# Patient Record
Sex: FEMALE | Race: WHITE | NOT HISPANIC OR LATINO | Employment: UNEMPLOYED | URBAN - METROPOLITAN AREA
[De-identification: names, ages, dates, MRNs, and addresses within clinical notes are randomized per-mention and may not be internally consistent; named-entity substitution may affect disease eponyms.]

---

## 2018-08-20 ENCOUNTER — HISTORICAL (OUTPATIENT)
Dept: LAB | Facility: HOSPITAL | Age: 28
End: 2018-08-20

## 2018-08-20 LAB
ALBUMIN SERPL-MCNC: 4.1 GM/DL (ref 3.4–5)
ALBUMIN/GLOB SERPL: 1.2 RATIO (ref 1.1–2)
ALP SERPL-CCNC: 52 UNIT/L (ref 46–116)
ALT SERPL-CCNC: 20 UNIT/L (ref 12–78)
AMPHET UR QL SCN: ABNORMAL
AST SERPL-CCNC: 14 UNIT/L (ref 15–37)
BARBITURATE SCN PRESENT UR: ABNORMAL
BENZODIAZ UR QL SCN: ABNORMAL
BILIRUB SERPL-MCNC: 1 MG/DL (ref 0.2–1)
BILIRUBIN DIRECT+TOT PNL SERPL-MCNC: 0.22 MG/DL (ref 0–0.2)
BILIRUBIN DIRECT+TOT PNL SERPL-MCNC: 0.78 MG/DL (ref 0–0.8)
BUN SERPL-MCNC: 10.8 MG/DL (ref 7–18)
CALCIUM SERPL-MCNC: 10.1 MG/DL (ref 8.5–10.1)
CANNABINOIDS UR QL SCN: ABNORMAL
CHLORIDE SERPL-SCNC: 102 MMOL/L (ref 98–107)
CO2 SERPL-SCNC: 27.5 MMOL/L (ref 21–32)
COCAINE UR QL SCN: ABNORMAL
CREAT SERPL-MCNC: 0.77 MG/DL (ref 0.6–1.3)
ERYTHROCYTE [DISTWIDTH] IN BLOOD BY AUTOMATED COUNT: 15.9 % (ref 11.5–17)
GLOBULIN SER-MCNC: 3.3 GM/DL (ref 2.4–3.5)
GLUCOSE SERPL-MCNC: 102 MG/DL (ref 74–106)
HCT VFR BLD AUTO: 32.9 % (ref 37–47)
HGB BLD-MCNC: 10.5 GM/DL (ref 12–16)
MCH RBC QN AUTO: 20 PG (ref 27–31)
MCHC RBC AUTO-ENTMCNC: 31.9 GM/DL (ref 33–36)
MCV RBC AUTO: 62.8 FL (ref 80–94)
OPIATES UR QL SCN: ABNORMAL
PCP UR QL: ABNORMAL
PH UR STRIP.AUTO: 6.5 [PH] (ref 5–7.5)
PLATELET # BLD AUTO: 273 X10(3)/MCL (ref 130–400)
PMV BLD AUTO: 9.3 FL (ref 9.4–12.4)
POTASSIUM SERPL-SCNC: 3.8 MMOL/L (ref 3.5–5.1)
PROT SERPL-MCNC: 7.4 GM/DL (ref 6.4–8.2)
RBC # BLD AUTO: 5.24 X10(6)/MCL (ref 4.2–5.4)
SODIUM SERPL-SCNC: 140 MMOL/L (ref 136–145)
TSH SERPL-ACNC: 2.01 MIU/ML (ref 0.36–3.74)
WBC # SPEC AUTO: 6 X10(3)/MCL (ref 4.5–11.5)

## 2019-04-29 ENCOUNTER — HISTORICAL (OUTPATIENT)
Dept: ADMINISTRATIVE | Facility: HOSPITAL | Age: 29
End: 2019-04-29

## 2020-06-15 ENCOUNTER — HISTORICAL (OUTPATIENT)
Dept: ADMINISTRATIVE | Facility: HOSPITAL | Age: 30
End: 2020-06-15

## 2020-06-15 LAB
APPEARANCE, UA: NORMAL
B-HCG UR QL: NEGATIVE
BACTERIA SPEC CULT: NEGATIVE /HPF
BILIRUB UR QL STRIP: NEGATIVE MG/DL
BUDDING YEAST: NORMAL /HPF
CASTS, URINE MICROSCOPIC: NEGATIVE /LPF
COLOR UR: YELLOW
EPITHELIAL, URINE MICROSCOPIC: NEGATIVE /HPF
GLUCOSE (UA): NEGATIVE MG/DL
HGB UR QL STRIP: NEGATIVE ERY/UL
INTERNAL NEG CONTROL: NEGATIVE
INTERNAL POS CONTROL: POSITIVE
KETONES UR QL STRIP: NEGATIVE MG/DL
LEUKOCYTE ESTERASE UR QL STRIP: NEGATIVE LEU/UL
NITRITE UR QL STRIP: NEGATIVE MG/DL
PH UR STRIP: 7 [PH] (ref 5–7.5)
PROT UR QL STRIP: NEGATIVE MG/DL
RBC #/AREA URNS HPF: NEGATIVE /HPF
SP GR UR STRIP: 1.02 (ref 1–1.03)
SPERM, URINE MICROSCOPIC: NORMAL /HPF
TYPE OF SPECIMEN  (UA): NORMAL
UNCLASSIFIED CRYSTALS, UA: NORMAL /HPF
UROBILINOGEN UR STRIP-ACNC: 1 EU/L
WBC #/AREA URNS HPF: 6 /HPF

## 2020-07-31 ENCOUNTER — HOSPITAL ENCOUNTER (EMERGENCY)
Facility: HOSPITAL | Age: 30
Discharge: HOME | End: 2020-07-31
Attending: FAMILY MEDICINE
Payer: MEDICAID

## 2020-07-31 VITALS
RESPIRATION RATE: 18 BRPM | OXYGEN SATURATION: 99 % | HEIGHT: 63 IN | TEMPERATURE: 99 F | HEART RATE: 91 BPM | SYSTOLIC BLOOD PRESSURE: 102 MMHG | WEIGHT: 139 LBS | BODY MASS INDEX: 24.63 KG/M2 | DIASTOLIC BLOOD PRESSURE: 62 MMHG

## 2020-07-31 DIAGNOSIS — R21 RASH: Primary | ICD-10-CM

## 2020-07-31 PROCEDURE — 99284 EMERGENCY DEPT VISIT MOD MDM: CPT | Mod: 25

## 2020-07-31 PROCEDURE — 63600175 PHARM REV CODE 636 W HCPCS: Performed by: NURSE PRACTITIONER

## 2020-07-31 PROCEDURE — 96372 THER/PROPH/DIAG INJ SC/IM: CPT

## 2020-07-31 RX ORDER — DEXTROAMPHETAMINE SACCHARATE, AMPHETAMINE ASPARTATE, DEXTROAMPHETAMINE SULFATE AND AMPHETAMINE SULFATE 7.5; 7.5; 7.5; 7.5 MG/1; MG/1; MG/1; MG/1
TABLET ORAL
COMMUNITY
End: 2023-01-11 | Stop reason: DRUGHIGH

## 2020-07-31 RX ORDER — METHYLPREDNISOLONE ACETATE 80 MG/ML
80 INJECTION, SUSPENSION INTRA-ARTICULAR; INTRALESIONAL; INTRAMUSCULAR; SOFT TISSUE
Status: COMPLETED | OUTPATIENT
Start: 2020-07-31 | End: 2020-07-31

## 2020-07-31 RX ORDER — TRIAMCINOLONE ACETONIDE 1 MG/G
CREAM TOPICAL 2 TIMES DAILY
Qty: 30 G | Refills: 0 | Status: SHIPPED | OUTPATIENT
Start: 2020-07-31 | End: 2023-07-12

## 2020-07-31 RX ADMIN — METHYLPREDNISOLONE ACETATE 80 MG: 80 INJECTION, SUSPENSION INTRA-ARTICULAR; INTRALESIONAL; INTRAMUSCULAR; SOFT TISSUE at 06:07

## 2020-07-31 NOTE — ED NOTES
Multiple small open areas to bilateral arms, left side of neck, left leg. No drainage noted.      Jemima Marinelli RN  07/31/20 1841       Jemima Marinelli RN  07/31/20 9123

## 2020-07-31 NOTE — ED PROVIDER NOTES
Encounter Date: 2020       History     Chief Complaint   Patient presents with    Skin Problem     Been having a cat for 2 weeks, and now I have these bites all over me, they are itchy, location - face, neck, arms and legs       Rash   This is a new problem. The current episode started several weeks ago. The problem has been unchanged. The problem is associated with animal contact (CAT). Affected Location: ARMS AND LEGS. The pain is at a severity of 0/10. Associated symptoms include itching. Treatments tried: TEA TREEE OIL  The treatment provided no relief.     Review of patient's allergies indicates:  No Known Allergies  History reviewed. No pertinent past medical history.  Past Surgical History:   Procedure Laterality Date     SECTION      DILATION AND CURETTAGE OF UTERUS      LAPAROSCOPY       History reviewed. No pertinent family history.  Social History     Tobacco Use    Smoking status: Never Smoker    Smokeless tobacco: Never Used   Substance Use Topics    Alcohol use: Not Currently    Drug use: Not on file     Review of Systems   Constitutional: Negative for fever.   HENT: Negative for sore throat.    Respiratory: Negative for shortness of breath.    Cardiovascular: Negative for chest pain.   Gastrointestinal: Negative for nausea.   Genitourinary: Negative for dysuria.   Musculoskeletal: Negative for back pain.   Skin: Positive for itching.   Neurological: Negative for weakness.   Hematological: Does not bruise/bleed easily.       Physical Exam     Initial Vitals [20 1719]   BP Pulse Resp Temp SpO2   102/62 91 18 98.7 °F (37.1 °C) 99 %      MAP       --         Physical Exam    Nursing note and vitals reviewed.  Constitutional: She appears well-developed and well-nourished.   HENT:   Mouth/Throat: Oropharynx is clear and moist.   Eyes: Conjunctivae are normal.   Neck: Phonation normal. No stridor present.   Cardiovascular: Intact distal pulses and normal pulses.   Pulmonary/Chest:  Effort normal. No accessory muscle usage or stridor. No respiratory distress.   Neurological: She is alert and oriented to person, place, and time.   Skin: Skin is warm and dry. Rash noted.              ED Course   Procedures  Labs Reviewed - No data to display       Imaging Results    None                                          Clinical Impression:       ICD-10-CM ICD-9-CM   1. Rash  R21 782.1             ED Disposition Condition    Discharge Stable        ED Prescriptions     Medication Sig Dispense Start Date End Date Auth. Provider    triamcinolone acetonide 0.1% (KENALOG) 0.1 % cream Apply topically 2 (two) times daily. 30 g 7/31/2020  Tyshawn Ruiz NP        Follow-up Information     Follow up With Specialties Details Why Contact Info    [facility] Psychology, Internal Medicine, Gynecology, Dental General Practice   [address] [place] [id number]  [phone]                                       Tyshawn Ruiz NP  07/31/20 1582

## 2020-08-02 ENCOUNTER — HOSPITAL ENCOUNTER (EMERGENCY)
Facility: HOSPITAL | Age: 30
Discharge: HOME | End: 2020-08-02
Attending: SURGERY
Payer: MEDICAID

## 2020-08-02 VITALS
HEART RATE: 72 BPM | WEIGHT: 143.75 LBS | OXYGEN SATURATION: 100 % | DIASTOLIC BLOOD PRESSURE: 81 MMHG | SYSTOLIC BLOOD PRESSURE: 137 MMHG | BODY MASS INDEX: 25.46 KG/M2 | RESPIRATION RATE: 16 BRPM | TEMPERATURE: 97 F

## 2020-08-02 DIAGNOSIS — R21 RASH AND NONSPECIFIC SKIN ERUPTION: Primary | ICD-10-CM

## 2020-08-02 PROCEDURE — 96372 THER/PROPH/DIAG INJ SC/IM: CPT

## 2020-08-02 PROCEDURE — 25000003 PHARM REV CODE 250: Performed by: SURGERY

## 2020-08-02 PROCEDURE — 99284 EMERGENCY DEPT VISIT MOD MDM: CPT | Mod: 25

## 2020-08-02 PROCEDURE — 63600175 PHARM REV CODE 636 W HCPCS: Performed by: SURGERY

## 2020-08-02 RX ORDER — PERMETHRIN 50 MG/G
CREAM TOPICAL
Qty: 60 G | Refills: 1 | Status: SHIPPED | OUTPATIENT
Start: 2020-08-02 | End: 2023-07-12

## 2020-08-02 RX ORDER — DIPHENHYDRAMINE HCL 50 MG
50 CAPSULE ORAL EVERY 6 HOURS PRN
Qty: 20 CAPSULE | Refills: 0 | Status: SHIPPED | OUTPATIENT
Start: 2020-08-02 | End: 2020-08-02 | Stop reason: SDUPTHER

## 2020-08-02 RX ORDER — METHYLPREDNISOLONE 4 MG/1
TABLET ORAL
Qty: 1 PACKAGE | Refills: 0 | Status: SHIPPED | OUTPATIENT
Start: 2020-08-02 | End: 2020-08-02 | Stop reason: SDUPTHER

## 2020-08-02 RX ORDER — CLINDAMYCIN HYDROCHLORIDE 300 MG/1
300 CAPSULE ORAL 4 TIMES DAILY
Qty: 28 CAPSULE | Refills: 0 | Status: SHIPPED | OUTPATIENT
Start: 2020-08-02 | End: 2020-08-02 | Stop reason: SDUPTHER

## 2020-08-02 RX ORDER — CLINDAMYCIN HYDROCHLORIDE 300 MG/1
300 CAPSULE ORAL 4 TIMES DAILY
Qty: 28 CAPSULE | Refills: 0 | Status: SHIPPED | OUTPATIENT
Start: 2020-08-02 | End: 2020-08-09

## 2020-08-02 RX ORDER — MUPIROCIN 20 MG/G
OINTMENT TOPICAL 3 TIMES DAILY
Qty: 15 G | Refills: 0 | Status: SHIPPED | OUTPATIENT
Start: 2020-08-02 | End: 2020-08-02 | Stop reason: SDUPTHER

## 2020-08-02 RX ORDER — METHYLPREDNISOLONE SOD SUCC 125 MG
125 VIAL (EA) INJECTION
Status: COMPLETED | OUTPATIENT
Start: 2020-08-02 | End: 2020-08-02

## 2020-08-02 RX ORDER — DIPHENHYDRAMINE HYDROCHLORIDE 50 MG/ML
50 INJECTION INTRAMUSCULAR; INTRAVENOUS
Status: DISCONTINUED | OUTPATIENT
Start: 2020-08-02 | End: 2020-08-02 | Stop reason: HOSPADM

## 2020-08-02 RX ORDER — CLINDAMYCIN PHOSPHATE 150 MG/ML
600 INJECTION, SOLUTION INTRAVENOUS
Status: COMPLETED | OUTPATIENT
Start: 2020-08-02 | End: 2020-08-02

## 2020-08-02 RX ORDER — DIPHENHYDRAMINE HCL 50 MG
50 CAPSULE ORAL EVERY 6 HOURS PRN
Qty: 20 CAPSULE | Refills: 0 | Status: SHIPPED | OUTPATIENT
Start: 2020-08-02 | End: 2023-07-12

## 2020-08-02 RX ORDER — MUPIROCIN 20 MG/G
OINTMENT TOPICAL 3 TIMES DAILY
Qty: 15 G | Refills: 0 | Status: SHIPPED | OUTPATIENT
Start: 2020-08-02 | End: 2020-08-12

## 2020-08-02 RX ORDER — METHYLPREDNISOLONE 4 MG/1
TABLET ORAL
Qty: 1 PACKAGE | Refills: 0 | Status: SHIPPED | OUTPATIENT
Start: 2020-08-02 | End: 2023-07-12

## 2020-08-02 RX ADMIN — METHYLPREDNISOLONE SODIUM SUCCINATE 125 MG: 125 INJECTION, POWDER, FOR SOLUTION INTRAMUSCULAR; INTRAVENOUS at 03:08

## 2020-08-02 RX ADMIN — CLINDAMYCIN PHOSPHATE 600 MG: 150 INJECTION, SOLUTION INTRAMUSCULAR; INTRAVENOUS at 03:08

## 2020-08-02 NOTE — ED PROVIDER NOTES
[facility]                                                 Chief Complaint  29 y.o. female with Insect Bite (bug bites noted to arms and legs)      History of Present Illness  Petra Springer presents to the emergency room with continued rash  Patient was seen in another emergency room with a diagnosis of ringworm  Patient states she wants to be treated for mites, wants permethrin Rx now  Patient on exam has indurated pustules on the trunk extremities this p.m.  Patient states she recently adopted a stray cat with rash on set afterwards    The history is provided by the patient   device was not used during this ER visit  History reviewed. No pertinent past medical history.   Surgeries:   section, laparoscopy and D&C  No Known Allergies     I have reviewed all of this patient's past medical, surgical, family, and social   histories as well as active allergies and medications documented in the  electronic medical record    Review of Systems and Physical Exam      Review of Systems  -- Constitution - no fever, denies fatigue, no weakness, no chills  -- Eyes - no tearing or redness, no visual disturbance  -- Ear, Nose - no tinnitus or earache, no nasal congestion or discharge  -- Mouth,Throat - no sore throat, no toothache, normal voice, normal swallowing  -- Respiratory - denies cough and congestion, no shortness of breath, no WEEMS  -- Cardiovascular - denies chest pain, no palpitations, denies claudication  -- Gastrointestinal - denies abdominal pain, nausea, vomiting, or diarrhea  -- Genitourinary - no dysuria, denies flank pain, no hematuria, no STD risk  -- Musculoskeletal - denies back pain, negative for trauma or injury  -- Neurological - no headache, denies weakness or seizure; no LOC  -- Skin - nonspecific rash on trunk extremities    Vital Signs  Her oral temperature is 97 °F (36.1 °C).   Her blood pressure is 137/81 and her pulse is 72.   Her respiration is  16 and oxygen saturation is 100%.     Physical Exam  -- Nursing note and vitals reviewed  -- Constitutional: Appears well-developed and well-nourished  -- Head: Atraumatic. Normocephalic. No obvious abnormality  -- Eyes: Pupils are equal and reactive to light. Normal conjunctiva and lids  -- Nose: Nose normal in appearance, nares grossly normal. No discharge  -- Throat: Mucous membranes moist, pharynx normal, normal tonsils. No lesions   -- Ears: External ears and TM normal bilaterally. Normal hearing and no drainage  -- Neck: Normal range of motion. Neck supple. No masses, trachea midline  -- Cardiac: Normal rate, regular rhythm and normal heart sounds  -- Pulmonary: Normal respiratory effort, breath sounds clear to auscultation  -- Abdominal: Soft, no tenderness. Normal bowel sounds. Normal liver edge  -- Musculoskeletal: Normal range of motion, no effusions. Joints stable   -- Neurological: No focal deficits. Showed good interaction with staff  -- Vascular: Posterior tibial, dorsalis pedis and radial pulses 2+ bilaterally    -- Lymphatics: No cervical or peripheral lymphadenopathy. No edema noted  -- Skin:  Nonspecific rash on trunk extremities    Emergency Room Course      Medications Given  clindamycin injection 600 mg (has no administration in time range)   diphenhydrAMINE injection 50 mg (has no administration in time range)   methylPREDNISolone sodium succinate injection 125 mg     ED Physician Management  -- Diagnosis management comments: 29 y.o. female with infected folliculitis  -- patient is asking for prescription of permethrin cream on ER discharge  -- patient thinks that she has bedbugs versus might, will give Rx today  -- patient has infected folliculitis, needs antibiotics with ointment Rx  -- patient extensively counseled follow up with Dermatology regarding this  -- patient needs thorough evaluation outpatient until resolution  -- return to the ER with any concerning symptoms after  discharge    Diagnosis  [R21] Rash and nonspecific skin eruption (Primary)    Disposition and Plan  -- Disposition: home  -- Condition: stable  -- Follow-up: Patient to follow up with MD in 1-2 days.  -- I advised the patient that we have found no life threatening condition today  -- At this time, I believe the patient is clinically stable for discharge.   -- The patient acknowledges that close follow up with a MD is required   -- Patient agrees to comply with all instruction and direction given in the ER    This note is dictated on M*Modal word recognition program.  There are word recognition mistakes that are occasionally missed on review.         Prosper Gonzales MD  08/02/20 7765

## 2020-08-02 NOTE — ED TRIAGE NOTES
29 y.o. female presents to ER qTrack 01/qTrk 01   Chief Complaint   Patient presents with    Insect Bite     bug bites noted to arms and legs   . No acute distress noted.

## 2022-07-20 ENCOUNTER — HOSPITAL ENCOUNTER (EMERGENCY)
Facility: HOSPITAL | Age: 32
Discharge: HOME | End: 2022-07-20
Attending: EMERGENCY MEDICINE
Payer: MEDICAID

## 2022-07-20 VITALS
SYSTOLIC BLOOD PRESSURE: 121 MMHG | WEIGHT: 137 LBS | DIASTOLIC BLOOD PRESSURE: 70 MMHG | RESPIRATION RATE: 18 BRPM | OXYGEN SATURATION: 99 % | HEART RATE: 70 BPM | TEMPERATURE: 99 F | BODY MASS INDEX: 24.27 KG/M2 | HEIGHT: 63 IN

## 2022-07-20 DIAGNOSIS — N20.0 KIDNEY STONE: Primary | ICD-10-CM

## 2022-07-20 DIAGNOSIS — D50.9 MICROCYTIC ANEMIA: ICD-10-CM

## 2022-07-20 LAB
ALBUMIN SERPL-MCNC: 4.3 GM/DL (ref 3.5–5)
ALBUMIN/GLOB SERPL: 1.4 RATIO (ref 1.1–2)
ALP SERPL-CCNC: 56 UNIT/L (ref 40–150)
ALT SERPL-CCNC: 12 UNIT/L (ref 0–55)
APPEARANCE UR: CLEAR
AST SERPL-CCNC: 14 UNIT/L (ref 5–34)
B-HCG SERPL QL: NEGATIVE
BACTERIA #/AREA URNS AUTO: ABNORMAL /HPF
BASOPHILS # BLD AUTO: 0.09 X10(3)/MCL (ref 0–0.2)
BASOPHILS NFR BLD AUTO: 0.7 %
BILIRUB UR QL STRIP.AUTO: NEGATIVE MG/DL
BILIRUBIN DIRECT+TOT PNL SERPL-MCNC: 1.1 MG/DL
BUN SERPL-MCNC: 13 MG/DL (ref 7–18.7)
CALCIUM SERPL-MCNC: 11.6 MG/DL (ref 8.4–10.2)
CHLORIDE SERPL-SCNC: 107 MMOL/L (ref 98–107)
CO2 SERPL-SCNC: 23 MMOL/L (ref 22–29)
COLOR UR AUTO: YELLOW
CREAT SERPL-MCNC: 0.75 MG/DL (ref 0.55–1.02)
EOSINOPHIL # BLD AUTO: 0.05 X10(3)/MCL (ref 0–0.9)
EOSINOPHIL NFR BLD AUTO: 0.4 %
ERYTHROCYTE [DISTWIDTH] IN BLOOD BY AUTOMATED COUNT: 16.5 % (ref 11.5–17)
GLOBULIN SER-MCNC: 3.1 GM/DL (ref 2.4–3.5)
GLUCOSE SERPL-MCNC: 98 MG/DL (ref 74–100)
GLUCOSE UR QL STRIP.AUTO: NEGATIVE MG/DL
HCT VFR BLD AUTO: 32.5 % (ref 37–47)
HGB BLD-MCNC: 10.1 GM/DL (ref 12–16)
HYPOCHROMIA BLD QL SMEAR: SLIGHT
IMM GRANULOCYTES # BLD AUTO: 0.08 X10(3)/MCL (ref 0–0.04)
IMM GRANULOCYTES NFR BLD AUTO: 0.6 %
KETONES UR QL STRIP.AUTO: NEGATIVE MG/DL
LEUKOCYTE ESTERASE UR QL STRIP.AUTO: NEGATIVE UNIT/L
LYMPHOCYTES # BLD AUTO: 1.57 X10(3)/MCL (ref 0.6–4.6)
LYMPHOCYTES NFR BLD AUTO: 11.8 %
MACROCYTES BLD QL SMEAR: SLIGHT
MCH RBC QN AUTO: 20 PG (ref 27–31)
MCHC RBC AUTO-ENTMCNC: 31.1 MG/DL (ref 33–36)
MCV RBC AUTO: 64.4 FL (ref 80–94)
MONOCYTES # BLD AUTO: 0.53 X10(3)/MCL (ref 0.1–1.3)
MONOCYTES NFR BLD AUTO: 4 %
MUCOUS THREADS URNS QL MICRO: ABNORMAL /LPF
NEUTROPHILS # BLD AUTO: 11 X10(3)/MCL (ref 2.1–9.2)
NEUTROPHILS NFR BLD AUTO: 82.5 %
NITRITE UR QL STRIP.AUTO: NEGATIVE
OVALOCYTES (OLG): ABNORMAL
PH UR STRIP.AUTO: 5 [PH]
PLATELET # BLD AUTO: 313 X10(3)/MCL (ref 130–400)
PLATELET # BLD EST: ADEQUATE 10*3/UL
PMV BLD AUTO: 10.6 FL (ref 7.4–10.4)
POIKILOCYTOSIS BLD QL SMEAR: ABNORMAL
POTASSIUM SERPL-SCNC: 4 MMOL/L (ref 3.5–5.1)
PROT SERPL-MCNC: 7.4 GM/DL (ref 6.4–8.3)
PROT UR QL STRIP.AUTO: ABNORMAL MG/DL
RBC # BLD AUTO: 5.05 X10(6)/MCL (ref 4.2–5.4)
RBC #/AREA URNS AUTO: ABNORMAL /HPF
RBC MORPH BLD: ABNORMAL
RBC UR QL AUTO: ABNORMAL UNIT/L
SODIUM SERPL-SCNC: 140 MMOL/L (ref 136–145)
SP GR UR STRIP.AUTO: >=1.03
SPHEROCYTES BLD QL SMEAR: SLIGHT
SQUAMOUS #/AREA URNS AUTO: ABNORMAL /HPF
STIPPLED RBC (OHS): SLIGHT
TARGETS BLD QL SMEAR: SLIGHT
UROBILINOGEN UR STRIP-ACNC: 1 MG/DL
WBC # SPEC AUTO: 13.3 X10(3)/MCL (ref 4.5–11.5)
WBC #/AREA URNS AUTO: ABNORMAL /HPF

## 2022-07-20 PROCEDURE — 81001 URINALYSIS AUTO W/SCOPE: CPT | Performed by: NURSE PRACTITIONER

## 2022-07-20 PROCEDURE — 81025 URINE PREGNANCY TEST: CPT | Performed by: NURSE PRACTITIONER

## 2022-07-20 PROCEDURE — 96361 HYDRATE IV INFUSION ADD-ON: CPT

## 2022-07-20 PROCEDURE — 80053 COMPREHEN METABOLIC PANEL: CPT | Performed by: EMERGENCY MEDICINE

## 2022-07-20 PROCEDURE — 63600175 PHARM REV CODE 636 W HCPCS: Performed by: EMERGENCY MEDICINE

## 2022-07-20 PROCEDURE — 99285 EMERGENCY DEPT VISIT HI MDM: CPT | Mod: 25

## 2022-07-20 PROCEDURE — 96374 THER/PROPH/DIAG INJ IV PUSH: CPT

## 2022-07-20 PROCEDURE — 36415 COLL VENOUS BLD VENIPUNCTURE: CPT | Performed by: EMERGENCY MEDICINE

## 2022-07-20 PROCEDURE — 85025 COMPLETE CBC W/AUTO DIFF WBC: CPT | Performed by: EMERGENCY MEDICINE

## 2022-07-20 RX ORDER — KETOROLAC TROMETHAMINE 30 MG/ML
30 INJECTION, SOLUTION INTRAMUSCULAR; INTRAVENOUS
Status: COMPLETED | OUTPATIENT
Start: 2022-07-20 | End: 2022-07-20

## 2022-07-20 RX ADMIN — SODIUM CHLORIDE, POTASSIUM CHLORIDE, SODIUM LACTATE AND CALCIUM CHLORIDE 1000 ML: 600; 310; 30; 20 INJECTION, SOLUTION INTRAVENOUS at 04:07

## 2022-07-20 RX ADMIN — KETOROLAC TROMETHAMINE 30 MG: 30 INJECTION, SOLUTION INTRAMUSCULAR; INTRAVENOUS at 04:07

## 2022-07-20 NOTE — ED PROVIDER NOTES
Encounter Date: 2022       History     Chief Complaint   Patient presents with    Abdominal Pain     C/o left sided abdominal pain since eating lunch.      Patient presents for concern of stabbing left lower quadrant pain that started approximately 1:30 p.m. today.  The pain made her nauseous and was made her throw up. She is not on her period.  Denies any vaginal discharge or concern for vaginal infection.  Because of the pain she has never experienced before she presents to the emergency department.  Without any intervention her symptoms have improved and is only very intermittent in nature.  No recent fevers or illnesses traumas or falls.  No known medical problems and does not take any medications on a daily basis.  No recent cough congestion chest pain or shortness of breath        Review of patient's allergies indicates:  No Known Allergies  Past Medical History:   Diagnosis Date    ADHD     Depression      Past Surgical History:   Procedure Laterality Date     SECTION      DILATION AND CURETTAGE OF UTERUS      LAPAROSCOPY       No family history on file.  Social History     Tobacco Use    Smoking status: Never Smoker    Smokeless tobacco: Never Used   Substance Use Topics    Alcohol use: Not Currently     Review of Systems   Constitutional: Negative for fever.   HENT: Negative for sore throat.    Respiratory: Negative for shortness of breath.    Cardiovascular: Negative for chest pain.   Gastrointestinal: Positive for abdominal pain and nausea.   Genitourinary: Negative for dysuria.   Musculoskeletal: Negative for back pain.   Skin: Negative for rash.   Neurological: Negative for weakness.   Hematological: Does not bruise/bleed easily.       Physical Exam     Initial Vitals [22 1358]   BP Pulse Resp Temp SpO2   124/84 71 20 98.6 °F (37 °C) 100 %      MAP       --         Physical Exam    Nursing note and vitals reviewed.  Constitutional: She appears well-developed and  well-nourished. No distress.   HENT:   Head: Normocephalic and atraumatic.   Eyes: EOM are normal. Pupils are equal, round, and reactive to light.   Neck:   Normal range of motion.  Cardiovascular: Normal rate and regular rhythm.   Pulmonary/Chest: No stridor. No respiratory distress. She has no wheezes.   Abdominal: Abdomen is soft. Bowel sounds are normal. She exhibits no distension. There is no abdominal tenderness. There is no rebound and no guarding.   Musculoskeletal:         General: Normal range of motion.      Cervical back: Normal range of motion.     Psychiatric: She has a normal mood and affect.         ED Course   Procedures   Admission on 07/20/2022   Component Date Value Ref Range Status    Color, UA 07/20/2022 Yellow  Yellow, Colorless, Other, Clear Final    Appearance, UA 07/20/2022 Clear  Clear Final    Specific Gravity, UA 07/20/2022 >=1.030   Final    pH, UA 07/20/2022 5.0  5.0, 5.5, 6.0, 6.5, 7.0, 7.5, 8.0, 8.5 Final    Protein, UA 07/20/2022 Trace (A) Negative, 300  mg/dL Final    Glucose, UA 07/20/2022 Negative  Negative, Normal mg/dL Final    Ketones, UA 07/20/2022 Negative  Negative, +1, +2, +3, +4, +5, >=160, >=80 mg/dL Final    Blood, UA 07/20/2022 Large (A) Negative unit/L Final    Bilirubin, UA 07/20/2022 Negative  Negative mg/dL Final    Urobilinogen, UA 07/20/2022 1.0  0.2, 1.0, Normal mg/dL Final    Nitrites, UA 07/20/2022 Negative  Negative Final    Leukocyte Esterase, UA 07/20/2022 Negative  Negative, 75  unit/L Final    Beta hCG Qualitative, Urine 07/20/2022 Negative  Negative Final    Bacteria, UA 07/20/2022 Rare  None Seen, Rare, Occasional /HPF Final    Mucous, UA 07/20/2022 Moderate (A) None Seen /LPF Final    RBC, UA 07/20/2022 11-20 (A) None Seen, 0-2, 3-5, 0-5 /HPF Final    WBC, UA 07/20/2022 0-2  None Seen, 0-2, 3-5, 0-5 /HPF Final    Squamous Epithelial Cells, UA 07/20/2022 Moderate (A) None Seen, Rare, Occasional, Occ /HPF Final    Sodium Level  07/20/2022 140  136 - 145 mmol/L Final    Potassium Level 07/20/2022 4.0  3.5 - 5.1 mmol/L Final    Chloride 07/20/2022 107  98 - 107 mmol/L Final    Carbon Dioxide 07/20/2022 23  22 - 29 mmol/L Final    Glucose Level 07/20/2022 98  74 - 100 mg/dL Final    Blood Urea Nitrogen 07/20/2022 13.0  7.0 - 18.7 mg/dL Final    Creatinine 07/20/2022 0.75  0.55 - 1.02 mg/dL Final    Calcium Level Total 07/20/2022 11.6 (A) 8.4 - 10.2 mg/dL Final    Protein Total 07/20/2022 7.4  6.4 - 8.3 gm/dL Final    Albumin Level 07/20/2022 4.3  3.5 - 5.0 gm/dL Final    Globulin 07/20/2022 3.1  2.4 - 3.5 gm/dL Final    Albumin/Globulin Ratio 07/20/2022 1.4  1.1 - 2.0 ratio Final    Bilirubin Total 07/20/2022 1.1  <=1.5 mg/dL Final    Alkaline Phosphatase 07/20/2022 56  40 - 150 unit/L Final    Alanine Aminotransferase 07/20/2022 12  0 - 55 unit/L Final    Aspartate Aminotransferase 07/20/2022 14  5 - 34 unit/L Final    Estimated GFR-Non  07/20/2022 >60  mls/min/1.73/m2 Final    WBC 07/20/2022 13.3 (A) 4.5 - 11.5 x10(3)/mcL Final    RBC 07/20/2022 5.05  4.20 - 5.40 x10(6)/mcL Final    Hgb 07/20/2022 10.1 (A) 12.0 - 16.0 gm/dL Final    Hct 07/20/2022 32.5 (A) 37.0 - 47.0 % Final    MCV 07/20/2022 64.4 (A) 80.0 - 94.0 fL Final    MCH 07/20/2022 20.0 (A) 27.0 - 31.0 pg Final    MCHC 07/20/2022 31.1 (A) 33.0 - 36.0 mg/dL Final    RDW 07/20/2022 16.5  11.5 - 17.0 % Final    Platelet 07/20/2022 313  130 - 400 x10(3)/mcL Final    MPV 07/20/2022 10.6 (A) 7.4 - 10.4 fL Final    Neut % 07/20/2022 82.5  % Final    Lymph % 07/20/2022 11.8  % Final    Mono % 07/20/2022 4.0  % Final    Eos % 07/20/2022 0.4  % Final    Basophil % 07/20/2022 0.7  % Final    Lymph # 07/20/2022 1.57  0.6 - 4.6 x10(3)/mcL Final    Neut # 07/20/2022 11.0 (A) 2.1 - 9.2 x10(3)/mcL Final    Mono # 07/20/2022 0.53  0.1 - 1.3 x10(3)/mcL Final    Eos # 07/20/2022 0.05  0 - 0.9 x10(3)/mcL Final    Baso # 07/20/2022 0.09  0 - 0.2  x10(3)/mcL Final    IG# 07/20/2022 0.08 (A) 0 - 0.04 x10(3)/mcL Final    IG% 07/20/2022 0.6  % Final    RBC Morph 07/20/2022 Abnormal (A) Normal Final    Hypochrom 07/20/2022 Slight (A) (none) Final    Macrocyte 07/20/2022 Slight (A) (none) Final    Ovalocytes 07/20/2022 2+ (A) (none) Final    Poik 07/20/2022 1+ (A) (none) Final    Spherocyte 07/20/2022 Slight (A) (none) Final    Stippled RBC 07/20/2022 Slight (A) (none) Final    Target Cell 07/20/2022 Slight (A) (none) Final    Platelet Est 07/20/2022 Adequate  Normal, Adequate Final     Labs Reviewed   URINALYSIS, REFLEX TO URINE CULTURE - Abnormal; Notable for the following components:       Result Value    Protein, UA Trace (*)     Blood, UA Large (*)     All other components within normal limits   URINALYSIS, MICROSCOPIC - Abnormal; Notable for the following components:    Mucous, UA Moderate (*)     RBC, UA 11-20 (*)     Squamous Epithelial Cells, UA Moderate (*)     All other components within normal limits   COMPREHENSIVE METABOLIC PANEL - Abnormal; Notable for the following components:    Calcium Level Total 11.6 (*)     All other components within normal limits   CBC WITH DIFFERENTIAL - Abnormal; Notable for the following components:    WBC 13.3 (*)     Hgb 10.1 (*)     Hct 32.5 (*)     MCV 64.4 (*)     MCH 20.0 (*)     MCHC 31.1 (*)     MPV 10.6 (*)     Neut # 11.0 (*)     IG# 0.08 (*)     All other components within normal limits   BLOOD SMEAR MICROSCOPIC EXAM (OLG) - Abnormal; Notable for the following components:    RBC Morph Abnormal (*)     Hypochrom Slight (*)     Macrocyte Slight (*)     Ovalocytes 2+ (*)     Poik 1+ (*)     Spherocyte Slight (*)     Stippled RBC Slight (*)     Target Cell Slight (*)     All other components within normal limits   HCG QUALITATIVE URINE - Normal   CBC W/ AUTO DIFFERENTIAL    Narrative:     The following orders were created for panel order CBC auto differential.  Procedure                                Abnormality         Status                     ---------                               -----------         ------                     CBC with Differential[[id number]]        Abnormal            Final result                 Please view results for these tests on the individual orders.          Imaging Results          CT Abdomen Pelvis  Without Contrast (Final result)  Result time 07/20/22 18:15:19    Final result by Marvin Tang MD (07/20/22 18:15:19)                 Impression:      No acute findings in the abdomen or pelvis, specifically there is no obstructive uropathy      Electronically signed by: Marvin Tang MD  Date:    07/20/2022  Time:    18:15             Narrative:    EXAMINATION:  CT ABDOMEN AND PELVIS    CLINICAL HISTORY:  Left-sided pain    TECHNIQUE:  Axial CT images were obtained through the abdomen and pelvis without contrast .  Coronal and sagittal reconstructions submitted and interpreted.  Total .  Automated exposure control utilized.    COMPARISON:  04/21/2017    FINDINGS:  Visualized lung bases are clear.    No renal or ureteral calculi are present.  There is no hydronephrosis.    Gallbladder, spleen, liver, pancreas and adrenals are normal on this noncontrast study.    Stomach, small bowel, appendix and colon are normal.  No free fluid or pneumoperitoneum.    No enlarged lymph nodes.  Abdominal aorta is normal in caliber.    Urinary bladder is normal.  Uterus is retroverted.  No adnexal mass.    No acute osseous findings.                                 Medications   lactated ringers bolus 1,000 mL (0 mLs Intravenous Stopped 7/20/22 1744)   ketorolac injection 30 mg (30 mg Intravenous Given 7/20/22 1649)     Medical Decision Making:   Initial Assessment:   Benign exam patient's symptoms have improved significantly without any intervention.  She does have blood in her urine the absence of any burning with urination or being on her menstrual cycle.  No history of kidney stones that  her symptoms are most consistent with that.  Will perform CT abdomen pelvis at this time with basic labs.  ED Management:  Urinalysis shows no signs of infection labs within normal limits a nonsignificant mild nonspecific leukocytosis masses any fevers.  Patient is nontoxic appearing.  Awaiting CT abdomen pelvis at this time.  Toradol been provided for intermittent pain control.             ED Course as of 07/20/22 1903 Wed Jul 20, 2022 1857 BRITTNI KELLER MD, assumed care at 1755.  Agree with above care plan and documentation.   Patient has been seen and examined.  She has complete resolution of symptoms after she urinated but she states that she had a strainer and has not passed any urine foreign objects but again the pain could is completely resolved from admit to the ER [PL]   1858 In reviewing labs she has a significant anemia with a profound low MCV consistent with iron deficiency.  When speaking with her she says her brother has thalassemia minor and she has a history of iron deficiency but has not had it checked in a very long time [PL]      ED Course User Index  [PL] Yosvany Alberto MD             Clinical Impression:   Final diagnoses:  [N20.0] Kidney stone (Primary)  [D50.9] Microcytic anemia          ED Disposition Condition    Discharge Stable        ED Prescriptions     None        Follow-up Information     Follow up With Specialties Details Why Contact Info    Primary care physician  In 1 week        Nora Arevalo is a certified MA and was present during the entire interaction with this patient     Yosvany Alberto MD  07/20/22 1903

## 2022-07-27 DIAGNOSIS — D64.9 ANEMIA, UNSPECIFIED TYPE: ICD-10-CM

## 2022-07-27 DIAGNOSIS — Z00.00 ENCOUNTER FOR WELLNESS EXAMINATION: Primary | ICD-10-CM

## 2022-07-29 ENCOUNTER — LAB VISIT (OUTPATIENT)
Dept: LAB | Facility: HOSPITAL | Age: 32
End: 2022-07-29
Attending: REGISTERED NURSE
Payer: MEDICAID

## 2022-07-29 DIAGNOSIS — D64.9 ANEMIA, UNSPECIFIED TYPE: ICD-10-CM

## 2022-07-29 DIAGNOSIS — Z00.00 ENCOUNTER FOR WELLNESS EXAMINATION: ICD-10-CM

## 2022-07-29 LAB
CHOLEST SERPL-MCNC: 137 MG/DL
CHOLEST/HDLC SERPL: 2 {RATIO} (ref 0–5)
DEPRECATED CALCIDIOL+CALCIFEROL SERPL-MC: 31.8 NG/ML (ref 30–80)
EST. AVERAGE GLUCOSE BLD GHB EST-MCNC: 82.5 MG/DL
FERRITIN SERPL-MCNC: 27.95 NG/ML (ref 4.63–204)
HBA1C MFR BLD: 4.5 %
HCV AB SERPL QL IA: NONREACTIVE
HDLC SERPL-MCNC: 59 MG/DL (ref 35–60)
HIV 1+2 AB+HIV1 P24 AG SERPL QL IA: NONREACTIVE
IRON SATN MFR SERPL: 36 % (ref 20–50)
IRON SERPL-MCNC: 100 UG/DL (ref 50–170)
LDLC SERPL CALC-MCNC: 47 MG/DL (ref 50–140)
TIBC SERPL-MCNC: 178 UG/DL (ref 70–310)
TIBC SERPL-MCNC: 278 UG/DL (ref 250–450)
TRIGL SERPL-MCNC: 154 MG/DL (ref 37–140)
VLDLC SERPL CALC-MCNC: 31 MG/DL

## 2022-07-29 PROCEDURE — 87389 HIV-1 AG W/HIV-1&-2 AB AG IA: CPT

## 2022-07-29 PROCEDURE — 83540 ASSAY OF IRON: CPT

## 2022-07-29 PROCEDURE — 83036 HEMOGLOBIN GLYCOSYLATED A1C: CPT

## 2022-07-29 PROCEDURE — 82728 ASSAY OF FERRITIN: CPT

## 2022-07-29 PROCEDURE — 82306 VITAMIN D 25 HYDROXY: CPT

## 2022-07-29 PROCEDURE — 36415 COLL VENOUS BLD VENIPUNCTURE: CPT

## 2022-07-29 PROCEDURE — 86803 HEPATITIS C AB TEST: CPT

## 2022-07-29 PROCEDURE — 80061 LIPID PANEL: CPT

## 2022-08-08 ENCOUNTER — LAB VISIT (OUTPATIENT)
Dept: LAB | Facility: HOSPITAL | Age: 32
End: 2022-08-08
Attending: REGISTERED NURSE
Payer: MEDICAID

## 2022-08-08 ENCOUNTER — OFFICE VISIT (OUTPATIENT)
Dept: FAMILY MEDICINE | Facility: CLINIC | Age: 32
End: 2022-08-08
Payer: MEDICAID

## 2022-08-08 VITALS
BODY MASS INDEX: 24.16 KG/M2 | HEIGHT: 63 IN | WEIGHT: 136.38 LBS | DIASTOLIC BLOOD PRESSURE: 66 MMHG | SYSTOLIC BLOOD PRESSURE: 110 MMHG | TEMPERATURE: 97 F | OXYGEN SATURATION: 99 % | HEART RATE: 103 BPM | RESPIRATION RATE: 18 BRPM

## 2022-08-08 DIAGNOSIS — Z00.00 ENCOUNTER FOR WELLNESS EXAMINATION: Primary | ICD-10-CM

## 2022-08-08 DIAGNOSIS — Z76.89 ENCOUNTER TO ESTABLISH CARE: ICD-10-CM

## 2022-08-08 DIAGNOSIS — R30.0 BURNING WITH URINATION: ICD-10-CM

## 2022-08-08 DIAGNOSIS — R79.89 ABNORMAL CBC: ICD-10-CM

## 2022-08-08 DIAGNOSIS — Z11.3 SCREENING EXAMINATION FOR STD (SEXUALLY TRANSMITTED DISEASE): ICD-10-CM

## 2022-08-08 LAB
BILIRUB SERPL-MCNC: ABNORMAL MG/DL
BLOOD URINE, POC: ABNORMAL
CLARITY, POC UA: ABNORMAL
COLOR, POC UA: ABNORMAL
GLUCOSE UR QL STRIP: ABNORMAL
KETONES UR QL STRIP: ABNORMAL
LEUKOCYTE ESTERASE URINE, POC: ABNORMAL
NITRITE, POC UA: NEGATIVE
PH, POC UA: 7.5
PROTEIN, POC: ABNORMAL
SPECIFIC GRAVITY, POC UA: 1.02
UROBILINOGEN, POC UA: 0.2

## 2022-08-08 PROCEDURE — 3078F DIAST BP <80 MM HG: CPT | Mod: CPTII,,, | Performed by: REGISTERED NURSE

## 2022-08-08 PROCEDURE — 3008F BODY MASS INDEX DOCD: CPT | Mod: CPTII,,, | Performed by: REGISTERED NURSE

## 2022-08-08 PROCEDURE — 87077 CULTURE AEROBIC IDENTIFY: CPT

## 2022-08-08 PROCEDURE — 81003 URINALYSIS AUTO W/O SCOPE: CPT | Performed by: REGISTERED NURSE

## 2022-08-08 PROCEDURE — 99204 OFFICE O/P NEW MOD 45 MIN: CPT | Mod: S$PBB,,, | Performed by: REGISTERED NURSE

## 2022-08-08 PROCEDURE — 99204 PR OFFICE/OUTPT VISIT, NEW, LEVL IV, 45-59 MIN: ICD-10-PCS | Mod: S$PBB,,, | Performed by: REGISTERED NURSE

## 2022-08-08 PROCEDURE — 3074F SYST BP LT 130 MM HG: CPT | Mod: CPTII,,, | Performed by: REGISTERED NURSE

## 2022-08-08 PROCEDURE — 3078F PR MOST RECENT DIASTOLIC BLOOD PRESSURE < 80 MM HG: ICD-10-PCS | Mod: CPTII,,, | Performed by: REGISTERED NURSE

## 2022-08-08 PROCEDURE — 3008F PR BODY MASS INDEX (BMI) DOCUMENTED: ICD-10-PCS | Mod: CPTII,,, | Performed by: REGISTERED NURSE

## 2022-08-08 PROCEDURE — 3074F PR MOST RECENT SYSTOLIC BLOOD PRESSURE < 130 MM HG: ICD-10-PCS | Mod: CPTII,,, | Performed by: REGISTERED NURSE

## 2022-08-08 PROCEDURE — 99214 OFFICE O/P EST MOD 30 MIN: CPT | Mod: PBBFAC,25 | Performed by: REGISTERED NURSE

## 2022-08-08 PROCEDURE — 99999 PR PBB SHADOW E&M-EST. PATIENT-LVL IV: ICD-10-PCS | Mod: PBBFAC,,, | Performed by: REGISTERED NURSE

## 2022-08-08 PROCEDURE — 1159F PR MEDICATION LIST DOCUMENTED IN MEDICAL RECORD: ICD-10-PCS | Mod: CPTII,,, | Performed by: REGISTERED NURSE

## 2022-08-08 PROCEDURE — 99999 PR PBB SHADOW E&M-EST. PATIENT-LVL IV: CPT | Mod: PBBFAC,,, | Performed by: REGISTERED NURSE

## 2022-08-08 PROCEDURE — 1159F MED LIST DOCD IN RCRD: CPT | Mod: CPTII,,, | Performed by: REGISTERED NURSE

## 2022-08-08 RX ORDER — PHENAZOPYRIDINE HYDROCHLORIDE 200 MG/1
200 TABLET, FILM COATED ORAL 3 TIMES DAILY PRN
Qty: 6 TABLET | Refills: 0 | Status: SHIPPED | OUTPATIENT
Start: 2022-08-08 | End: 2022-08-10

## 2022-08-08 RX ORDER — NITROFURANTOIN 25; 75 MG/1; MG/1
100 CAPSULE ORAL 2 TIMES DAILY
Qty: 10 CAPSULE | Refills: 0 | Status: SHIPPED | OUTPATIENT
Start: 2022-08-08 | End: 2022-08-13

## 2022-08-08 NOTE — PATIENT INSTRUCTIONS
Healthy lifestyle discussed with patient   Urine culture, GC/CT sent to lab, will call patient with the results  Macrobid and Pyridium sent to pharmacy on file with instructions  Additional lab work ordered today, will call patient with results  Drink plenty of fluids  Get plenty of rest  Return to clinic in 3 month for Pap smear, HIV and hep C testing prior to visit

## 2022-08-08 NOTE — PROGRESS NOTES
Subjective:       Patient ID: Petra Springer is a 31 y.o. female.    Chief Complaint: est care, wellness w/completed labs    Patient is a 31-year-old female here today to establish care and for wellness exam.  Labs reviewed with patient today.  Patient complaining of dysuria and urinary frequency x1 day, patient also states she had unprotected sex with a new partner.  Patient denies past medical and surgical history.  Patient denies prior PCP.    Review of Systems   Constitutional: Positive for fatigue. Negative for chills and fever.   HENT: Negative.    Eyes: Negative.    Respiratory: Negative.  Negative for cough and shortness of breath.    Cardiovascular: Negative.  Negative for chest pain and palpitations.   Gastrointestinal: Negative.    Endocrine: Negative.    Genitourinary: Positive for dysuria and urgency. Negative for vaginal discharge.   Musculoskeletal: Negative.    Integumentary:  Negative.   Allergic/Immunologic: Negative.    Neurological: Negative.    Hematological: Negative.    Psychiatric/Behavioral: Negative.          Objective:      Physical Exam  Constitutional:       Appearance: Normal appearance.   HENT:      Head: Normocephalic.      Right Ear: Tympanic membrane normal.      Nose: Nose normal.      Mouth/Throat:      Mouth: Mucous membranes are moist.   Eyes:      Pupils: Pupils are equal, round, and reactive to light.   Cardiovascular:      Rate and Rhythm: Normal rate.      Heart sounds: Normal heart sounds.   Pulmonary:      Effort: Pulmonary effort is normal. No respiratory distress.      Breath sounds: Normal breath sounds. No wheezing.   Abdominal:      General: Abdomen is flat. Bowel sounds are normal.      Palpations: Abdomen is soft.   Musculoskeletal:         General: Normal range of motion.      Cervical back: Normal range of motion.   Skin:     General: Skin is warm and dry.      Capillary Refill: Capillary refill takes less than 2 seconds.   Neurological:      General: No  focal deficit present.      Mental Status: She is alert and oriented to person, place, and time.   Psychiatric:         Mood and Affect: Mood normal.         Behavior: Behavior normal.         Thought Content: Thought content normal.         Judgment: Judgment normal.         Assessment:       Problem List Items Addressed This Visit    None     Visit Diagnoses     Burning with urination    -  Primary    Relevant Orders    POCT URINE DIPSTICK WITHOUT MICROSCOPE (Completed)          Plan:    Healthy lifestyle discussed with patient   Urine culture, GC/CT sent to lab, will call patient with the results  Macrobid and Pyridium sent to pharmacy on file with instructions  Additional lab work ordered today, will call patient with results  Drink plenty of fluids  Get plenty of rest  Return to clinic in 3 month for Pap smear, HIV and hep C testing prior to visit

## 2022-08-10 DIAGNOSIS — D64.9 ANEMIA, UNSPECIFIED TYPE: Primary | ICD-10-CM

## 2022-08-10 LAB
BACTERIA UR CULT: ABNORMAL
BACTERIA UR CULT: ABNORMAL

## 2022-08-17 ENCOUNTER — OFFICE VISIT (OUTPATIENT)
Dept: HEMATOLOGY/ONCOLOGY | Facility: CLINIC | Age: 32
End: 2022-08-17
Payer: MEDICAID

## 2022-08-17 VITALS
RESPIRATION RATE: 20 BRPM | TEMPERATURE: 98 F | SYSTOLIC BLOOD PRESSURE: 106 MMHG | DIASTOLIC BLOOD PRESSURE: 72 MMHG | BODY MASS INDEX: 24.51 KG/M2 | HEIGHT: 63 IN | WEIGHT: 138.31 LBS | HEART RATE: 81 BPM

## 2022-08-17 DIAGNOSIS — R79.89 ABNORMAL CBC: ICD-10-CM

## 2022-08-17 DIAGNOSIS — D50.9 MICROCYTIC ANEMIA: ICD-10-CM

## 2022-08-17 DIAGNOSIS — D64.9 ANEMIA, UNSPECIFIED TYPE: Primary | ICD-10-CM

## 2022-08-17 PROCEDURE — 3078F PR MOST RECENT DIASTOLIC BLOOD PRESSURE < 80 MM HG: ICD-10-PCS | Mod: CPTII,,, | Performed by: INTERNAL MEDICINE

## 2022-08-17 PROCEDURE — 99204 PR OFFICE/OUTPT VISIT, NEW, LEVL IV, 45-59 MIN: ICD-10-PCS | Mod: S$PBB,,, | Performed by: INTERNAL MEDICINE

## 2022-08-17 PROCEDURE — 99204 OFFICE O/P NEW MOD 45 MIN: CPT | Mod: S$PBB,,, | Performed by: INTERNAL MEDICINE

## 2022-08-17 PROCEDURE — 1159F PR MEDICATION LIST DOCUMENTED IN MEDICAL RECORD: ICD-10-PCS | Mod: CPTII,,, | Performed by: INTERNAL MEDICINE

## 2022-08-17 PROCEDURE — 1159F MED LIST DOCD IN RCRD: CPT | Mod: CPTII,,, | Performed by: INTERNAL MEDICINE

## 2022-08-17 PROCEDURE — 3008F PR BODY MASS INDEX (BMI) DOCUMENTED: ICD-10-PCS | Mod: CPTII,,, | Performed by: INTERNAL MEDICINE

## 2022-08-17 PROCEDURE — 3078F DIAST BP <80 MM HG: CPT | Mod: CPTII,,, | Performed by: INTERNAL MEDICINE

## 2022-08-17 PROCEDURE — 3008F BODY MASS INDEX DOCD: CPT | Mod: CPTII,,, | Performed by: INTERNAL MEDICINE

## 2022-08-17 PROCEDURE — 3074F SYST BP LT 130 MM HG: CPT | Mod: CPTII,,, | Performed by: INTERNAL MEDICINE

## 2022-08-17 PROCEDURE — 3074F PR MOST RECENT SYSTOLIC BLOOD PRESSURE < 130 MM HG: ICD-10-PCS | Mod: CPTII,,, | Performed by: INTERNAL MEDICINE

## 2022-08-17 PROCEDURE — 99999 PR PBB SHADOW E&M-EST. PATIENT-LVL III: CPT | Mod: PBBFAC,,, | Performed by: INTERNAL MEDICINE

## 2022-08-17 PROCEDURE — 99999 PR PBB SHADOW E&M-EST. PATIENT-LVL III: ICD-10-PCS | Mod: PBBFAC,,, | Performed by: INTERNAL MEDICINE

## 2022-08-17 PROCEDURE — 99213 OFFICE O/P EST LOW 20 MIN: CPT | Mod: PBBFAC,25 | Performed by: INTERNAL MEDICINE

## 2022-08-17 RX ORDER — FOLIC ACID 1 MG/1
1 TABLET ORAL DAILY
Qty: 100 TABLET | Refills: 3 | Status: SHIPPED | OUTPATIENT
Start: 2022-08-17 | End: 2022-08-26 | Stop reason: SDUPTHER

## 2022-08-17 RX ORDER — FERROUS SULFATE 325(65) MG
325 TABLET ORAL DAILY
Qty: 30 TABLET | Refills: 3 | Status: SHIPPED | OUTPATIENT
Start: 2022-08-17 | End: 2023-07-12

## 2022-08-17 NOTE — PROGRESS NOTES
Subjective:       Patient ID: Petra Springer is a 31 y.o. female.    Chief Complaint: Anemia (Pt stated no concerts)      HPI      Petra Springer  31 y.o.  female with no significant past medical history referred for evaluation of microcytic anemia.     Pt has been told in the past of iron deficiency and is taking intermittent oral iron OTC, denies any issues with it. Denies any blood in the BM or dark color BM . Pt denies blood transfusion, other than once when treated for hemorrhagic cyst. Her brother is diagnosed with thalassemia trait. Denies any other issues.     Past Medical History:   Diagnosis Date    ADHD     Anemia       Past Surgical History:   Procedure Laterality Date     SECTION       SECTION      DILATION AND CURETTAGE OF UTERUS      hemarageric cyst      LAPAROSCOPY      miscarrage      MOUTH SURGERY      OVARY SURGERY       Social History     Socioeconomic History    Marital status:    Occupational History    Occupation: full time   Tobacco Use    Smoking status: Never Smoker    Smokeless tobacco: Never Used   Substance and Sexual Activity    Alcohol use: Not Currently     Comment: occasionally    Drug use: Never    Sexual activity: Yes     Partners: Male     Birth control/protection: Condom      Family History   Problem Relation Age of Onset    Thalassemia Brother     Anemia Brother     Cervical cancer Mother     Hypertension Mother     Breast cancer Maternal Grandmother     Anemia Maternal Grandfather       Review of patient's allergies indicates:   Allergen Reactions    Wasp sting [allergen ext-venom-honey bee] Swelling      Review of Systems   Constitutional: Negative for appetite change and unexpected weight change.   HENT: Negative for mouth sores.    Eyes: Negative for visual disturbance.   Respiratory: Negative for cough and shortness of breath.    Cardiovascular: Negative for chest pain.   Gastrointestinal: Negative for abdominal  pain and diarrhea.   Genitourinary: Negative for frequency.   Musculoskeletal: Negative for back pain.   Integumentary:  Negative for rash.   Neurological: Negative for headaches.   Hematological: Negative for adenopathy.   Psychiatric/Behavioral: The patient is not nervous/anxious.          Objective:        Vitals:    08/17/22 1354   BP: 106/72   Pulse: 81   Resp: 20   Temp: 98.4 °F (36.9 °C)        Physical Exam  Constitutional:       Appearance: She is well-developed.   HENT:      Head: Normocephalic and atraumatic.   Eyes:      Conjunctiva/sclera: Conjunctivae normal.   Cardiovascular:      Rate and Rhythm: Normal rate and regular rhythm.      Heart sounds: Normal heart sounds.   Pulmonary:      Effort: Pulmonary effort is normal. No respiratory distress.      Breath sounds: Normal breath sounds. No wheezing.   Abdominal:      General: Bowel sounds are normal. There is no distension.      Palpations: Abdomen is soft.      Tenderness: There is no abdominal tenderness.   Musculoskeletal:         General: Normal range of motion.      Cervical back: Normal range of motion and neck supple.   Skin:     General: Skin is warm.   Neurological:      Mental Status: She is alert and oriented to person, place, and time.      Cranial Nerves: No cranial nerve deficit.         LABS AND IMAGING REVIEWED IN EPIC          Assessment:     ECOG Performance status: 0    Plan:     Microcytic anemia:   - Iron panel: noted to have low normal ferritin of 27.95.   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 Hb A2 5.3 % is increased. In the correct clinical context, this is consistent with beta thalassemia trait. In some conditions Hb A2 can be increased in the absence of beta thalassemia such as with KLF1 mutations, hyperthyroidism and megaloblastic anemia.   PLAN:  - Discussed thalassemia trait findings with the patient and genetic significance   - will check the gene studies to confirm the findings   - will  follow up on path smear review   - prescribed ferrous sulfate 325 mg daily for iron deficiency and folic acid for low folate levels        The patient was seen, interviewed and examined. Pertinent lab and radiology studies were reviewed.     Armaan Oliveros MD  Hematology / Oncology

## 2022-08-26 DIAGNOSIS — D50.9 MICROCYTIC ANEMIA: ICD-10-CM

## 2022-08-26 DIAGNOSIS — D64.9 ANEMIA, UNSPECIFIED TYPE: ICD-10-CM

## 2022-08-26 RX ORDER — FOLIC ACID 1 MG/1
1 TABLET ORAL DAILY
Qty: 100 TABLET | Refills: 3 | Status: SHIPPED | OUTPATIENT
Start: 2022-08-26 | End: 2023-07-12

## 2022-09-12 ENCOUNTER — OFFICE VISIT (OUTPATIENT)
Dept: FAMILY MEDICINE | Facility: CLINIC | Age: 32
End: 2022-09-12
Payer: MEDICAID

## 2022-09-12 VITALS
DIASTOLIC BLOOD PRESSURE: 75 MMHG | OXYGEN SATURATION: 100 % | WEIGHT: 136.38 LBS | SYSTOLIC BLOOD PRESSURE: 107 MMHG | BODY MASS INDEX: 24.16 KG/M2 | TEMPERATURE: 98 F | HEART RATE: 75 BPM | HEIGHT: 63 IN

## 2022-09-12 DIAGNOSIS — Z12.4 PAP SMEAR FOR CERVICAL CANCER SCREENING: Primary | ICD-10-CM

## 2022-09-12 DIAGNOSIS — R10.30 LOWER ABDOMINAL PAIN: ICD-10-CM

## 2022-09-12 DIAGNOSIS — R10.2 ADNEXAL TENDERNESS: ICD-10-CM

## 2022-09-12 DIAGNOSIS — Z12.39 ENCOUNTER FOR SCREENING BREAST EXAMINATION AND DISCUSSION OF BREAST SELF EXAMINATION: ICD-10-CM

## 2022-09-12 DIAGNOSIS — N76.0 BACTERIAL VAGINOSIS: ICD-10-CM

## 2022-09-12 DIAGNOSIS — B96.89 BACTERIAL VAGINOSIS: ICD-10-CM

## 2022-09-12 PROCEDURE — 3074F PR MOST RECENT SYSTOLIC BLOOD PRESSURE < 130 MM HG: ICD-10-PCS | Mod: CPTII,,, | Performed by: REGISTERED NURSE

## 2022-09-12 PROCEDURE — 3074F SYST BP LT 130 MM HG: CPT | Mod: CPTII,,, | Performed by: REGISTERED NURSE

## 2022-09-12 PROCEDURE — 99215 PR OFFICE/OUTPT VISIT, EST, LEVL V, 40-54 MIN: ICD-10-PCS | Mod: S$PBB,,, | Performed by: REGISTERED NURSE

## 2022-09-12 PROCEDURE — 99215 OFFICE O/P EST HI 40 MIN: CPT | Mod: S$PBB,,, | Performed by: REGISTERED NURSE

## 2022-09-12 PROCEDURE — 99999 PR PBB SHADOW E&M-EST. PATIENT-LVL IV: CPT | Mod: PBBFAC,,, | Performed by: REGISTERED NURSE

## 2022-09-12 PROCEDURE — 99999 PR PBB SHADOW E&M-EST. PATIENT-LVL IV: ICD-10-PCS | Mod: PBBFAC,,, | Performed by: REGISTERED NURSE

## 2022-09-12 PROCEDURE — 1159F PR MEDICATION LIST DOCUMENTED IN MEDICAL RECORD: ICD-10-PCS | Mod: CPTII,,, | Performed by: REGISTERED NURSE

## 2022-09-12 PROCEDURE — 1159F MED LIST DOCD IN RCRD: CPT | Mod: CPTII,,, | Performed by: REGISTERED NURSE

## 2022-09-12 PROCEDURE — 3078F PR MOST RECENT DIASTOLIC BLOOD PRESSURE < 80 MM HG: ICD-10-PCS | Mod: CPTII,,, | Performed by: REGISTERED NURSE

## 2022-09-12 PROCEDURE — 3008F BODY MASS INDEX DOCD: CPT | Mod: CPTII,,, | Performed by: REGISTERED NURSE

## 2022-09-12 PROCEDURE — 3008F PR BODY MASS INDEX (BMI) DOCUMENTED: ICD-10-PCS | Mod: CPTII,,, | Performed by: REGISTERED NURSE

## 2022-09-12 PROCEDURE — 3078F DIAST BP <80 MM HG: CPT | Mod: CPTII,,, | Performed by: REGISTERED NURSE

## 2022-09-12 PROCEDURE — 99214 OFFICE O/P EST MOD 30 MIN: CPT | Mod: PBBFAC | Performed by: REGISTERED NURSE

## 2022-09-12 RX ORDER — METRONIDAZOLE 500 MG/1
500 TABLET ORAL EVERY 12 HOURS
Qty: 14 TABLET | Refills: 0 | Status: SHIPPED | OUTPATIENT
Start: 2022-09-12 | End: 2022-09-19

## 2022-09-12 NOTE — PROGRESS NOTES
Subjective:       Patient ID: Petra Springer is a 31 y.o. female.    Chief Complaint: Gynecologic Exam, Exposure to STD (Blood work?), and Referral (Scan to check on Fibroids )    Patient presents to clinic today for Pap smear and STD testing.  Patient complaining of lower abdominal pain x1 week.  Patient denies vaginal bleeding and or discharge.  Review of Systems   Constitutional: Negative.    HENT: Negative.     Eyes: Negative.    Respiratory: Negative.     Cardiovascular: Negative.    Gastrointestinal:  Positive for abdominal pain.   Endocrine: Negative.    Genitourinary:  Positive for pelvic pain.   Musculoskeletal: Negative.    Integumentary:  Negative.   Allergic/Immunologic: Negative.    Neurological: Negative.    Hematological: Negative.    Psychiatric/Behavioral: Negative.         Objective:      Physical Exam  Exam conducted with a chaperone present.   Constitutional:       Appearance: Normal appearance.   HENT:      Head: Normocephalic.      Right Ear: Tympanic membrane normal.      Left Ear: Tympanic membrane normal.      Nose: Nose normal.      Mouth/Throat:      Mouth: Mucous membranes are moist.   Eyes:      Pupils: Pupils are equal, round, and reactive to light.   Cardiovascular:      Rate and Rhythm: Normal rate and regular rhythm.      Pulses: Normal pulses.      Heart sounds: Normal heart sounds.   Pulmonary:      Effort: Pulmonary effort is normal.      Breath sounds: Normal breath sounds.   Chest:   Breasts:     Right: Normal.      Left: Normal.      Comments: Breasts symmetric and smooth without masses.  Areola without discharge.  No adenopathy palpated.  Abdominal:      General: Abdomen is flat. Bowel sounds are normal.      Palpations: Abdomen is soft.   Genitourinary:     General: Normal vulva.      Exam position: Lithotomy position.      Pubic Area: No rash or pubic lice.       Vagina: Vaginal discharge present.      Cervix: No cervical motion tenderness.      Uterus: Normal.        Adnexa:         Right: Tenderness present.         Left: Tenderness present.       Rectum: Normal.      Comments: No inguinal adenopathy.  External genital without erythema, lesions, or masses.  Vaginal mucosa pink, thick white discharge noted .Cervix parous, pink and without discharge.  Patient has both right and left adnexal tenderness.  Pap smear obtained.  Musculoskeletal:         General: Normal range of motion.      Cervical back: Normal range of motion.   Lymphadenopathy:      Upper Body:      Right upper body: No supraclavicular, axillary or pectoral adenopathy.      Left upper body: No supraclavicular, axillary or pectoral adenopathy.   Skin:     General: Skin is warm and dry.      Capillary Refill: Capillary refill takes less than 2 seconds.   Neurological:      General: No focal deficit present.      Mental Status: She is alert and oriented to person, place, and time.   Psychiatric:         Mood and Affect: Mood normal.         Behavior: Behavior normal.         Thought Content: Thought content normal.         Judgment: Judgment normal.       Assessment:       Problem List Items Addressed This Visit    None  Visit Diagnoses       Pap smear for cervical cancer screening    -  Primary    Lower abdominal pain               Plan:   Bilateral lower abdominal pain-UA completed in office, negative.  Pap smear obtained.  Abdominal ultrasound ordered, will call patient for results  Bacterial vaginosis-Flagyl 500 mg p.o. b.i.d. x7 days sent to pharmacy, patient instructed not to consume alcohol while on this medication.  Clinical breast exam completed.  Patient educated on the importance of monthly self breast exams.   Return to clinic as needed.    I spent a total of 45 minutes on the day of the visit.This includes face to face time and non-face to face time preparing to see the patient (eg, review of tests), obtaining and/or reviewing separately obtained history, documenting clinical information in the electronic  or other health record, independently interpreting results and communicating results to the patient/family/caregiver, or care coordinator.

## 2022-09-21 ENCOUNTER — TELEPHONE (OUTPATIENT)
Dept: FAMILY MEDICINE | Facility: CLINIC | Age: 32
End: 2022-09-21
Payer: MEDICAID

## 2022-09-21 LAB
HUMAN PAPILLOMAVIRUS (HPV): NORMAL
PAP RECOMMENDATION EXT: NORMAL
PAP SMEAR: NORMAL

## 2022-09-21 NOTE — TELEPHONE ENCOUNTER
Pap and STD testing results received, called patient to schedule appt, no answer, voicemail left for patient to call back.

## 2022-09-26 ENCOUNTER — TELEPHONE (OUTPATIENT)
Dept: FAMILY MEDICINE | Facility: CLINIC | Age: 32
End: 2022-09-26
Payer: MEDICAID

## 2022-10-07 ENCOUNTER — HOSPITAL ENCOUNTER (OUTPATIENT)
Dept: RADIOLOGY | Facility: HOSPITAL | Age: 32
Discharge: HOME | End: 2022-10-07
Attending: REGISTERED NURSE
Payer: MEDICAID

## 2022-10-07 DIAGNOSIS — R10.2 ADNEXAL TENDERNESS: ICD-10-CM

## 2022-10-07 DIAGNOSIS — R10.30 LOWER ABDOMINAL PAIN: ICD-10-CM

## 2022-10-07 PROCEDURE — 76856 US EXAM PELVIC COMPLETE: CPT | Mod: TC

## 2022-10-10 ENCOUNTER — TELEPHONE (OUTPATIENT)
Dept: FAMILY MEDICINE | Facility: CLINIC | Age: 32
End: 2022-10-10
Payer: MEDICAID

## 2022-10-10 ENCOUNTER — OFFICE VISIT (OUTPATIENT)
Dept: FAMILY MEDICINE | Facility: CLINIC | Age: 32
End: 2022-10-10
Payer: MEDICAID

## 2022-10-10 VITALS
DIASTOLIC BLOOD PRESSURE: 74 MMHG | RESPIRATION RATE: 16 BRPM | HEART RATE: 78 BPM | SYSTOLIC BLOOD PRESSURE: 118 MMHG | HEIGHT: 63 IN | WEIGHT: 138 LBS | BODY MASS INDEX: 24.45 KG/M2 | OXYGEN SATURATION: 98 %

## 2022-10-10 DIAGNOSIS — Z72.51 HIGH RISK HETEROSEXUAL BEHAVIOR: ICD-10-CM

## 2022-10-10 DIAGNOSIS — A74.9 CHLAMYDIA INFECTION: Primary | ICD-10-CM

## 2022-10-10 PROCEDURE — 99214 PR OFFICE/OUTPT VISIT, EST, LEVL IV, 30-39 MIN: ICD-10-PCS | Mod: S$PBB,,, | Performed by: REGISTERED NURSE

## 2022-10-10 PROCEDURE — 99999 PR PBB SHADOW E&M-EST. PATIENT-LVL IV: CPT | Mod: PBBFAC,,, | Performed by: REGISTERED NURSE

## 2022-10-10 PROCEDURE — 1159F MED LIST DOCD IN RCRD: CPT | Mod: CPTII,,, | Performed by: REGISTERED NURSE

## 2022-10-10 PROCEDURE — 99999 PR PBB SHADOW E&M-EST. PATIENT-LVL IV: ICD-10-PCS | Mod: PBBFAC,,, | Performed by: REGISTERED NURSE

## 2022-10-10 PROCEDURE — 3078F PR MOST RECENT DIASTOLIC BLOOD PRESSURE < 80 MM HG: ICD-10-PCS | Mod: CPTII,,, | Performed by: REGISTERED NURSE

## 2022-10-10 PROCEDURE — 3074F SYST BP LT 130 MM HG: CPT | Mod: CPTII,,, | Performed by: REGISTERED NURSE

## 2022-10-10 PROCEDURE — 99214 OFFICE O/P EST MOD 30 MIN: CPT | Mod: S$PBB,,, | Performed by: REGISTERED NURSE

## 2022-10-10 PROCEDURE — 3008F BODY MASS INDEX DOCD: CPT | Mod: CPTII,,, | Performed by: REGISTERED NURSE

## 2022-10-10 PROCEDURE — 99214 OFFICE O/P EST MOD 30 MIN: CPT | Mod: PBBFAC | Performed by: REGISTERED NURSE

## 2022-10-10 PROCEDURE — 3008F PR BODY MASS INDEX (BMI) DOCUMENTED: ICD-10-PCS | Mod: CPTII,,, | Performed by: REGISTERED NURSE

## 2022-10-10 PROCEDURE — 3078F DIAST BP <80 MM HG: CPT | Mod: CPTII,,, | Performed by: REGISTERED NURSE

## 2022-10-10 PROCEDURE — 1159F PR MEDICATION LIST DOCUMENTED IN MEDICAL RECORD: ICD-10-PCS | Mod: CPTII,,, | Performed by: REGISTERED NURSE

## 2022-10-10 PROCEDURE — 3074F PR MOST RECENT SYSTOLIC BLOOD PRESSURE < 130 MM HG: ICD-10-PCS | Mod: CPTII,,, | Performed by: REGISTERED NURSE

## 2022-10-10 RX ORDER — AZITHROMYCIN 250 MG/1
1000 TABLET, FILM COATED ORAL
Status: COMPLETED | OUTPATIENT
Start: 2022-10-10 | End: 2022-10-10

## 2022-10-10 RX ADMIN — AZITHROMYCIN 1000 MG: 250 TABLET, FILM COATED ORAL at 02:10

## 2022-10-10 NOTE — PROGRESS NOTES
Subjective:       Patient ID: Petra Springer is a 31 y.o. female.    Chief Complaint: Follow-up (Results from ultrasound)     STD testing was positive for chlamydia, patient here for treatment and counseling.  Review of Systems   Constitutional: Negative.    HENT: Negative.     Eyes: Negative.    Respiratory: Negative.     Cardiovascular: Negative.    Gastrointestinal: Negative.    Endocrine: Negative.    Genitourinary:  Positive for vaginal discharge.        +Ct on pap result   Musculoskeletal: Negative.    Integumentary:  Negative.   Allergic/Immunologic: Negative.    Neurological: Negative.    Hematological: Negative.    Psychiatric/Behavioral: Negative.         Objective:      Physical Exam  Constitutional:       Appearance: Normal appearance.   HENT:      Head: Normocephalic.      Right Ear: Tympanic membrane normal.      Left Ear: Tympanic membrane normal.      Nose: Nose normal.      Mouth/Throat:      Mouth: Mucous membranes are moist.   Eyes:      Pupils: Pupils are equal, round, and reactive to light.   Cardiovascular:      Rate and Rhythm: Normal rate and regular rhythm.      Pulses: Normal pulses.      Heart sounds: Normal heart sounds.   Pulmonary:      Effort: Pulmonary effort is normal.      Breath sounds: Normal breath sounds.   Abdominal:      General: Abdomen is flat. Bowel sounds are normal.      Palpations: Abdomen is soft.   Musculoskeletal:         General: Normal range of motion.      Cervical back: Normal range of motion and neck supple.   Skin:     General: Skin is warm and dry.      Capillary Refill: Capillary refill takes less than 2 seconds.   Neurological:      General: No focal deficit present.      Mental Status: She is alert and oriented to person, place, and time.   Psychiatric:         Mood and Affect: Mood normal.         Behavior: Behavior normal.         Thought Content: Thought content normal.         Judgment: Judgment normal.     I spent a total of 30 minutes on the day  of the visit.This includes face to face time and non-face to face time preparing to see the patient (eg, review of tests), obtaining and/or reviewing separately obtained history, documenting clinical information in the electronic or other health record, independently interpreting results and communicating results to the patient/family/caregiver, or care coordinator.    Assessment:       Problem List Items Addressed This Visit          ID    Chlamydia infection - Primary    Relevant Medications    azithromycin tablet 1,000 mg (Start on 10/10/2022  2:15 PM)       Other    High risk heterosexual behavior       Plan:   Chlamydia infection-patient treated in office azithromycin 1 mg p.o. x1.  High risk sexual behavior-patient instructed to use condoms at all times, all partner within the last 60 days should be tested and treated for chlamydia, No sex for at least 7 days after treatment  Return to clinic as needed

## 2022-11-09 ENCOUNTER — OFFICE VISIT (OUTPATIENT)
Dept: FAMILY MEDICINE | Facility: CLINIC | Age: 32
End: 2022-11-09
Payer: MEDICAID

## 2022-11-09 VITALS
BODY MASS INDEX: 24.34 KG/M2 | SYSTOLIC BLOOD PRESSURE: 127 MMHG | TEMPERATURE: 99 F | OXYGEN SATURATION: 100 % | HEIGHT: 63 IN | HEART RATE: 85 BPM | RESPIRATION RATE: 16 BRPM | DIASTOLIC BLOOD PRESSURE: 88 MMHG | WEIGHT: 137.38 LBS

## 2022-11-09 DIAGNOSIS — Z72.51 HIGH RISK SEXUAL BEHAVIOR, UNSPECIFIED TYPE: Primary | ICD-10-CM

## 2022-11-09 PROCEDURE — 3079F PR MOST RECENT DIASTOLIC BLOOD PRESSURE 80-89 MM HG: ICD-10-PCS | Mod: CPTII,,, | Performed by: REGISTERED NURSE

## 2022-11-09 PROCEDURE — 3074F SYST BP LT 130 MM HG: CPT | Mod: CPTII,,, | Performed by: REGISTERED NURSE

## 2022-11-09 PROCEDURE — 99999 PR PBB SHADOW E&M-EST. PATIENT-LVL IV: ICD-10-PCS | Mod: PBBFAC,,, | Performed by: REGISTERED NURSE

## 2022-11-09 PROCEDURE — 1159F MED LIST DOCD IN RCRD: CPT | Mod: CPTII,,, | Performed by: REGISTERED NURSE

## 2022-11-09 PROCEDURE — 99214 OFFICE O/P EST MOD 30 MIN: CPT | Mod: PBBFAC | Performed by: REGISTERED NURSE

## 2022-11-09 PROCEDURE — 3008F PR BODY MASS INDEX (BMI) DOCUMENTED: ICD-10-PCS | Mod: CPTII,,, | Performed by: REGISTERED NURSE

## 2022-11-09 PROCEDURE — 3079F DIAST BP 80-89 MM HG: CPT | Mod: CPTII,,, | Performed by: REGISTERED NURSE

## 2022-11-09 PROCEDURE — 1159F PR MEDICATION LIST DOCUMENTED IN MEDICAL RECORD: ICD-10-PCS | Mod: CPTII,,, | Performed by: REGISTERED NURSE

## 2022-11-09 PROCEDURE — 99212 PR OFFICE/OUTPT VISIT, EST, LEVL II, 10-19 MIN: ICD-10-PCS | Mod: S$PBB,,, | Performed by: REGISTERED NURSE

## 2022-11-09 PROCEDURE — 99999 PR PBB SHADOW E&M-EST. PATIENT-LVL IV: CPT | Mod: PBBFAC,,, | Performed by: REGISTERED NURSE

## 2022-11-09 PROCEDURE — 3074F PR MOST RECENT SYSTOLIC BLOOD PRESSURE < 130 MM HG: ICD-10-PCS | Mod: CPTII,,, | Performed by: REGISTERED NURSE

## 2022-11-09 PROCEDURE — 99212 OFFICE O/P EST SF 10 MIN: CPT | Mod: S$PBB,,, | Performed by: REGISTERED NURSE

## 2022-11-09 PROCEDURE — 3008F BODY MASS INDEX DOCD: CPT | Mod: CPTII,,, | Performed by: REGISTERED NURSE

## 2022-11-09 NOTE — PROGRESS NOTES
Subjective:       Patient ID: Petra Springer is a 32 y.o. female.    Chief Complaint: Follow-up    Patient is here for follow-up, requesting repeat HIV and hep C today.  No additional complaints.  Review of Systems   All other systems reviewed and are negative.      Objective:      Physical Exam  Constitutional:       Appearance: Normal appearance.   HENT:      Head: Normocephalic.      Right Ear: Tympanic membrane normal.      Left Ear: Tympanic membrane normal.      Nose: Nose normal.      Mouth/Throat:      Mouth: Mucous membranes are moist.   Eyes:      Pupils: Pupils are equal, round, and reactive to light.   Cardiovascular:      Rate and Rhythm: Normal rate and regular rhythm.      Pulses: Normal pulses.      Heart sounds: Normal heart sounds.   Pulmonary:      Effort: Pulmonary effort is normal.      Breath sounds: Normal breath sounds.   Abdominal:      General: Abdomen is flat.      Palpations: Abdomen is soft.   Genitourinary:     Vagina: No vaginal discharge.   Musculoskeletal:         General: Normal range of motion.      Cervical back: Normal range of motion.   Skin:     General: Skin is warm.      Capillary Refill: Capillary refill takes less than 2 seconds.   Neurological:      General: No focal deficit present.      Mental Status: She is alert and oriented to person, place, and time.   Psychiatric:         Mood and Affect: Mood normal.         Behavior: Behavior normal.         Thought Content: Thought content normal.         Judgment: Judgment normal.       Assessment:       Problem List Items Addressed This Visit    None  Visit Diagnoses       High risk sexual behavior, unspecified type    -  Primary    Relevant Orders    HIV 1/2 Ag/Ab (4th Gen)    Hepatitis C Antibody        I spent a total of 15 minutes on the day of the visit.This includes face to face time and non-face to face time preparing to see the patient (eg, review of tests), obtaining and/or reviewing separately obtained history,  documenting clinical information in the electronic or other health record, independently interpreting results and communicating results to the patient/family/caregiver, or care coordinator.    Plan:   High risk sexual behavior-patient instructed to use condoms at all times, HIV and hep C ordered will call patient with the results.   Return to clinic if needed

## 2022-11-10 ENCOUNTER — TELEPHONE (OUTPATIENT)
Dept: PRIMARY CARE CLINIC | Facility: CLINIC | Age: 32
End: 2022-11-10
Payer: MEDICAID

## 2022-11-10 NOTE — TELEPHONE ENCOUNTER
----- Message from AKIL Fitch sent at 11/10/2022  8:45 AM CST -----  Regarding: results  Inform patient that her HIV and HEP C was negative, ok to leave voicemail we discussed it in clinic yesterday.  ----- Message -----  From: Background User [facility]  Sent: 11/9/2022   5:56 PM CST  To: AKIL Fitch

## 2023-01-04 ENCOUNTER — DOCUMENTATION ONLY (OUTPATIENT)
Dept: ADMINISTRATIVE | Facility: HOSPITAL | Age: 33
End: 2023-01-04
Payer: MEDICAID

## 2023-01-09 ENCOUNTER — HISTORICAL (OUTPATIENT)
Dept: ADMINISTRATIVE | Facility: HOSPITAL | Age: 33
End: 2023-01-09
Payer: MEDICAID

## 2023-01-11 ENCOUNTER — OFFICE VISIT (OUTPATIENT)
Dept: FAMILY MEDICINE | Facility: CLINIC | Age: 33
End: 2023-01-11
Payer: MEDICAID

## 2023-01-11 VITALS
HEART RATE: 87 BPM | SYSTOLIC BLOOD PRESSURE: 106 MMHG | DIASTOLIC BLOOD PRESSURE: 73 MMHG | BODY MASS INDEX: 24.63 KG/M2 | WEIGHT: 139 LBS | OXYGEN SATURATION: 98 % | HEIGHT: 63 IN | TEMPERATURE: 98 F | RESPIRATION RATE: 18 BRPM

## 2023-01-11 DIAGNOSIS — S62.307D CLOSED DISPLACED FRACTURE OF FIFTH METACARPAL BONE OF LEFT HAND WITH ROUTINE HEALING, UNSPECIFIED PORTION OF METACARPAL, SUBSEQUENT ENCOUNTER: ICD-10-CM

## 2023-01-11 DIAGNOSIS — S62.92XD CLOSED FRACTURE OF LEFT HAND WITH ROUTINE HEALING, SUBSEQUENT ENCOUNTER: ICD-10-CM

## 2023-01-11 DIAGNOSIS — S62.339D CLOSED BOXER'S FRACTURE WITH ROUTINE HEALING, SUBSEQUENT ENCOUNTER: Primary | ICD-10-CM

## 2023-01-11 PROCEDURE — 3008F PR BODY MASS INDEX (BMI) DOCUMENTED: ICD-10-PCS | Mod: CPTII,,, | Performed by: REGISTERED NURSE

## 2023-01-11 PROCEDURE — 99214 OFFICE O/P EST MOD 30 MIN: CPT | Mod: PBBFAC | Performed by: REGISTERED NURSE

## 2023-01-11 PROCEDURE — 99999 PR PBB SHADOW E&M-EST. PATIENT-LVL IV: CPT | Mod: PBBFAC,,, | Performed by: REGISTERED NURSE

## 2023-01-11 PROCEDURE — 3008F BODY MASS INDEX DOCD: CPT | Mod: CPTII,,, | Performed by: REGISTERED NURSE

## 2023-01-11 PROCEDURE — 99213 PR OFFICE/OUTPT VISIT, EST, LEVL III, 20-29 MIN: ICD-10-PCS | Mod: S$PBB,,, | Performed by: REGISTERED NURSE

## 2023-01-11 PROCEDURE — 3078F PR MOST RECENT DIASTOLIC BLOOD PRESSURE < 80 MM HG: ICD-10-PCS | Mod: CPTII,,, | Performed by: REGISTERED NURSE

## 2023-01-11 PROCEDURE — 3078F DIAST BP <80 MM HG: CPT | Mod: CPTII,,, | Performed by: REGISTERED NURSE

## 2023-01-11 PROCEDURE — 99999 PR PBB SHADOW E&M-EST. PATIENT-LVL IV: ICD-10-PCS | Mod: PBBFAC,,, | Performed by: REGISTERED NURSE

## 2023-01-11 PROCEDURE — 3074F SYST BP LT 130 MM HG: CPT | Mod: CPTII,,, | Performed by: REGISTERED NURSE

## 2023-01-11 PROCEDURE — 99213 OFFICE O/P EST LOW 20 MIN: CPT | Mod: S$PBB,,, | Performed by: REGISTERED NURSE

## 2023-01-11 PROCEDURE — 1159F MED LIST DOCD IN RCRD: CPT | Mod: CPTII,,, | Performed by: REGISTERED NURSE

## 2023-01-11 PROCEDURE — 3074F PR MOST RECENT SYSTOLIC BLOOD PRESSURE < 130 MM HG: ICD-10-PCS | Mod: CPTII,,, | Performed by: REGISTERED NURSE

## 2023-01-11 PROCEDURE — 1159F PR MEDICATION LIST DOCUMENTED IN MEDICAL RECORD: ICD-10-PCS | Mod: CPTII,,, | Performed by: REGISTERED NURSE

## 2023-01-11 RX ORDER — METHYLPHENIDATE HYDROCHLORIDE 27 MG/1
27 TABLET, EXTENDED RELEASE ORAL EVERY MORNING
COMMUNITY
Start: 2023-01-09

## 2023-01-11 RX ORDER — DEXTROAMPHETAMINE SULFATE, DEXTROAMPHETAMINE SACCHARATE, AMPHETAMINE SULFATE AND AMPHETAMINE ASPARTATE 3.75; 3.75; 3.75; 3.75 MG/1; MG/1; MG/1; MG/1
CAPSULE, EXTENDED RELEASE ORAL
COMMUNITY
Start: 2022-12-07 | End: 2023-01-27

## 2023-01-11 RX ORDER — DEXMETHYLPHENIDATE HYDROCHLORIDE 20 MG/1
20 CAPSULE, EXTENDED RELEASE ORAL EVERY MORNING
COMMUNITY
Start: 2023-01-05 | End: 2023-07-12

## 2023-01-11 RX ORDER — IBUPROFEN 800 MG/1
800 TABLET ORAL 3 TIMES DAILY
Qty: 30 TABLET | Refills: 1 | Status: SHIPPED | OUTPATIENT
Start: 2023-01-11 | End: 2023-07-12

## 2023-01-11 RX ORDER — ACETAMINOPHEN AND CODEINE PHOSPHATE 300; 30 MG/1; MG/1
1 TABLET ORAL EVERY 6 HOURS PRN
COMMUNITY
Start: 2023-01-10 | End: 2023-01-27

## 2023-01-12 PROBLEM — S62.92XD CLOSED FRACTURE OF LEFT HAND WITH ROUTINE HEALING: Status: ACTIVE | Noted: 2023-01-12

## 2023-01-12 NOTE — PROGRESS NOTES
Subjective:       Patient ID: Petra Springer is a 32 y.o. female.    Chief Complaint: No chief complaint on file.    Patient presents to clinic with 5th metacarpal fracture of left hand.  Patient stated she punched a wall 3 days ago, left hand in ulnar splint.  Patient rates pain 4/10 on pain scale.  Review of Systems   Musculoskeletal:  Positive for joint swelling and joint deformity.   Neurological:  Negative for numbness.   All other systems reviewed and are negative.      Objective:      Physical Exam  Vitals reviewed.   Constitutional:       Appearance: Normal appearance.   HENT:      Head: Normocephalic.      Mouth/Throat:      Mouth: Mucous membranes are moist.   Eyes:      Pupils: Pupils are equal, round, and reactive to light.   Cardiovascular:      Rate and Rhythm: Normal rate.      Pulses: Normal pulses.      Heart sounds: Normal heart sounds.   Pulmonary:      Effort: Pulmonary effort is normal.      Breath sounds: Normal breath sounds.   Musculoskeletal:         General: Tenderness, deformity and signs of injury present.      Left hand: Tenderness and bony tenderness present. Decreased range of motion. Decreased strength. Normal capillary refill. Normal pulse.      Cervical back: Normal range of motion.      Comments: Ulnur splint to L hand   Neurological:      Mental Status: She is alert.       Assessment:       Problem List Items Addressed This Visit          Orthopedic    Closed fracture of left hand with routine healing    Relevant Medications    ibuprofen (ADVIL,MOTRIN) 800 MG tablet     Other Visit Diagnoses       Closed boxer's fracture with routine healing, subsequent encounter    -  Primary    Relevant Orders    Ambulatory referral/consult to Orthopedics    Closed displaced fracture of fifth metacarpal bone of left hand with routine healing, unspecified portion of metacarpal, subsequent encounter            I spent a total of 30 minutes on the day of the visit.This includes face to face  time and non-face to face time preparing to see the patient (eg, review of tests), obtaining and/or reviewing separately obtained history, documenting clinical information in the electronic or other health record, independently interpreting results and communicating results to the patient/family/caregiver, or care coordinator.     Plan:   Closed fracture-orthopedic referral sent,   Motrin 800 mg p.o. t.i.d. p.r.n. pain sent to pharmacy on file, take with food.  Please report any numbness, fingers discoloration, or increasing pain immediately.

## 2023-01-23 ENCOUNTER — OFFICE VISIT (OUTPATIENT)
Dept: ORTHOPEDICS | Facility: CLINIC | Age: 33
End: 2023-01-23
Payer: MEDICAID

## 2023-01-23 ENCOUNTER — HOSPITAL ENCOUNTER (OUTPATIENT)
Dept: RADIOLOGY | Facility: HOSPITAL | Age: 33
Discharge: HOME | End: 2023-01-23
Attending: STUDENT IN AN ORGANIZED HEALTH CARE EDUCATION/TRAINING PROGRAM
Payer: MEDICAID

## 2023-01-23 VITALS
HEIGHT: 63 IN | BODY MASS INDEX: 23.89 KG/M2 | SYSTOLIC BLOOD PRESSURE: 133 MMHG | DIASTOLIC BLOOD PRESSURE: 86 MMHG | HEART RATE: 108 BPM | WEIGHT: 134.81 LBS

## 2023-01-23 DIAGNOSIS — S62.339D CLOSED BOXER'S FRACTURE WITH ROUTINE HEALING, SUBSEQUENT ENCOUNTER: ICD-10-CM

## 2023-01-23 DIAGNOSIS — S62.337A DISPLACED FRACTURE OF NECK OF FIFTH METACARPAL BONE, LEFT HAND, INITIAL ENCOUNTER FOR CLOSED FRACTURE: Primary | ICD-10-CM

## 2023-01-23 PROCEDURE — 73130 X-RAY EXAM OF HAND: CPT | Mod: TC,LT

## 2023-01-23 PROCEDURE — 26600 TREAT METACARPAL FRACTURE: CPT | Mod: PBBFAC | Performed by: STUDENT IN AN ORGANIZED HEALTH CARE EDUCATION/TRAINING PROGRAM

## 2023-01-23 PROCEDURE — 99215 OFFICE O/P EST HI 40 MIN: CPT | Mod: PBBFAC

## 2023-01-23 RX ORDER — ACYCLOVIR 200 MG/1
400 CAPSULE ORAL
COMMUNITY
Start: 2023-01-18

## 2023-01-23 NOTE — PROGRESS NOTES
"Subjective:    Patient ID: Petra Springer is a left handed 32 y.o. female  who presented to [facility] for new visit..    Chief Complaint: Injury and Pain of the Left Hand    History of Present Illness:  Petra Springer who has no formally previously diagnosed musculoskeletal condition presented today with left fifth metacarpal fracture after she punched the wall on 1/08/2023 (2 weeks ago). Immediately patient complained of pain and swelling on her hand. She was seen in the ED and was diagnosed with the fracture. Patient placed on an ulnar gutter splint at that time and referred to her PCP. Patient now following up with us. Swelling and ecchymosis significantly improved per patient. She currently does not have any pain on her hand, but does get pain with palpation of the metacarpal. Expectations for today's visit includes: formal evaluation.  Occupation includes: . PCP is AKIL Fitch.    Hand Review of Systems:  Swelling?  yes  Limited ROM? yes  Fever/Chills? no  Numbness/Tingling? no     Objective:      Physical Exam:  /86   Pulse 108   Ht 5' 3" (1.6 m)   Wt 61.1 kg (134 lb 12.8 oz)   BMI 23.88 kg/m²     Appearance:  Soft tissue swelling: Left: yes Right: no  Effusion: Left:  Negative Right: Negative  Erythema: Left no Right: no  Ecchymosis: Left: yes Right: no  Atrophy: Left: no Right: no    Palpation:  Hand/wrist Tenderness: Left: 5th metacarpal shaft  Right: none    Range of motion:  Limited  active ROM of her fingers d/t swelling and stiffness  Normal cascade with passive flexion    AIN/PIN/Radial nerve: Intact and symmetric    General appearance: NAD  Peripheral pulses: normal bilaterally   Reflexes: Left: Not performed Right: Not performed   Sensation: normal    Labs:  Last A1c: The patient doesn't have any registry metric data available     Imaging:   Previous images performed. Images not available  X-rays ordered and " performed today: yes  # of views: 3 Laterality: left  My Interpretation:  Displaced angulated fracture of the fifth metacarpal bone at the neck. Angulation measured to be within acceptable range of 50-60 degrees.       Assessment:      Encounter Diagnoses   Code Name Primary?    S62.337A Displaced fracture of neck of fifth metacarpal bone, left hand, initial encounter for closed fracture Yes      Plan:      Dx: Closed displaced fracture of the left 5th metacarpal neck Acute in moderate exacerbation.   Treatment Plan: Discussed with patient diagnosis and treatment recommendations.   - Spoke with orthopaedic partners who briefly evaluated the patient. Will treat patient non-operatively given the normal cascade on examination.  - Natural history and expected course discussed. Questions answered.  - Rest, ice, compression, and elevation (RICE) therapy  - Ulnar gutter splint  - RTC in 2 weeks for repeat XR to re-evaluate for malrotation / healing progression  - Can start ROM exercises at that time out of the splint if radiologically / clinically healed  Imaging: radiological studies ordered and independently reviewed; discussed with patient; pending radiologist interpretation.   Activity: Ulnar gutter splint of L hand  Therapy: No formal therapy  Medication: first line treatment with daily acetaminophen. Up to 1000 mg three times daily can be taken; medication precautions given.. Please see your primary care physician for further refills.  RTC: 2 weeks.         [employer] 1/23/2023

## 2023-01-23 NOTE — PROGRESS NOTES
"Subjective:    Patient ID: Petra Springer is a right handed 32 y.o. female  who presented to [facility] for new visit..      Chief Complaint: No chief complaint on file.    History of Present Illness:  HPI    Petra Springer who has no formally previously diagnosed musculoskeletal condition presented today with Fracture of left 5th metacarpal fracture after she punched a wall on 01/08/2023.  involving the {laterality:91716} upper extremity for the past {0-10:01522} {DAYS, WEEKS ,MONTHS:75948}. Pain is located at {location:635174}. Quality of pain is described as {:422586}.  Radiates to {:333997}. Inciting event: {inciting event:11404}.  Pain is aggravated by {causes; pain shoulder:88083} . There {is/is not:9024} a history of {wrist hx:27397}. Evaluation to date: {prior evaluations:14263}. Treatment to date: {therapies:47534}. Expectations for today's visit includes ***.  Occupation includes ***. PCP is ***.    Hand Review of Systems:  Swelling?  {YES/NO:20267::"no"}  Instability?  {YES/NO:20267::"no"}  Clicking?  {YES/NO:20267::"no"}  Limited ROM? {YES/NO:20267::"no"}  Fever/Chills? {YES/NO:20267::"no"}  Subluxation? {YES/NO:20267::"no"}  Dislocation? {YES/NO:20267::"no"}  Numbness/Tingling? {YES/NO:20267::"no"}  Weakness? {YES/NO:20267::"no"}    Current Choice of Exercise:  {exercise options:35435::"none"}    ROS       Objective:      Physical Exam:    There were no vitals taken for this visit.    Ortho/SPM Exam    Appearance:  Soft tissue swelling: Left: {yes/no:22519::"no"} Right: {yes/no:66347::"no"}  Effusion: Left:  {smc PE -/+:99647::"Negative"} Right: {smc PE -/+:54950::"Negative"}  Erythema: Left {yes/no:89535::"no"} Right: {yes/no:24509::"no"}  Ecchymosis: Left: {yes/no:78195::"no"} Right: {yes/no:89508::"no"}  Atrophy: Left: {yes/no:05068::"no"} Right: {yes/no:72930::"no"}    Palpation:  Hand/wrist Tenderness: Left: {location:793514}  Right: " "{location:481838}    Range of motion:  Flexion (0-80): Left:  {numbers 1-180:46959::"80"} Right: {numbers 1-180:41989::"80"}  Extension (0-70): Left:  {numbers 1-180:05039::"70"} Right: {numbers 1-180:28342::"70"}  Ulnar deviation (0-30): {Numbers; 0-50 (by 5):75487::"30"} Right: {Numbers; 0-50 (by 5):34942::"30"}  Radial deviation (0-20): {Numbers; 0-50 (by 5):02908::"20"} Right: {Numbers; 0-50 (by 5):65398::"20"}  Supination (0-90): Left: {numbers 1-180:08314::"90"} Right: {numbers 1-180:85649::"90"}  Pronation (0-90): Left: {numbers 1-180:61325::"90"} Right: {numbers 1-180:78138::"90"}  Able to make a power fist and claw hand: on {Right, left-initial cap:5607::"Both"} hand(s)  Distal palmar crease-finger tip distance: {NUMBERS; 0-10:5044::"0"} on {Right, left-initial cap:5607::"Both"} hand(s)    Strength:  Flexion: Left: {strength -/5:80291::"5/5"} Pain: {YES/NO:20267::"no"} Right: {strength -/5:01806::"5/5"} Pain: {YES/NO:20267::"no"}  Extension: Left: {strength -/5:44677::"5/5"} Pain: {YES/NO:20267::"no"} Right: {strength -/5:52668::"5/5"} Pain: {YES/NO:20267::"no"}  Supination: Left: {strength -/5:84873::"5/5"} Pain: {YES/NO:20267::"no"} Right: {strength -/5:38394::"5/5"} Pain: {YES/NO:20267::"no"}  Pronation: Left: {strength -/5:85906::"5/5"} Pain: {YES/NO:20267::"no"} Right: {strength -/5:57629::"5/5"} Pain: {YES/NO:20267::"no"}  Ulnar deviation: Left: {strength -/5:30601::"5/5"} Pain: {YES/NO:20267::"no"} Right: {strength -/5:75665::"5/5"} Pain: {YES/NO:20267::"no"}  Radial deviation: Left: {strength -/5:55674::"5/5"} Pain: {YES/NO:20267::"no"} Right: {strength -/5:27968::"5/5"} Pain: {YES/NO:20267::"no"}    Special Tests:  Durkans Test (Carpal Compression test): Left: {smc PE -/+:70261::"Negative"}  Right: {smc PE -/+:65973::"Negative"}  Tinels:  Left: {smc PE -/+:72973::"Negative"}  Right: {smc PE -/+:91300::"Negative"}    Phalens: Left: {smc PE -/+:42294::"Negative"}  Right: {smc PE " "-/+:51868::"Negative"}    Bob Litler test:  Left: {smc PE -/+:34530::"Negative"}  Right: {smc PE -/+:78767::"Negative"}    UCL laxity: Left: {smc PE -/+:60251::"Not performed"}  Right: {smc PE -/+:63557::"Not performed"}  FDP test: Left: {smc PE -/+:02866::"Negative"}  Right: {smc PE -/+:31083::"Negative"}  FDS test: Left: {smc PE -/+:35930::"Negative"}  Right: {smc PE -/+:60042::"Negative"}  Reverse Phalens: Left: {smc PE -/+:65812::"Not performed"} Right: {smc PE -/+:84236::"Not performed"}  Finkelstein's Test: Left: {smc PE -/+:35038::"Negative"} Right: {smc PE -/+:29898::"Negative"}    Froments: Left: {smc PE -/+:65612::"Not performed"} Right: {smc PE -/+:30069::"Not performed"}  Shuck Test: Left: {smc PE -/+:66601::"Not performed"} Right: {smc PE -/+:50672::"Not performed"}    AIN/PIN/Radial nerve: {intact and symmetric?:46578::"Intact and symmetric"}    General appearance: NAD  Peripheral pulses: {Pulses:61244::"normal bilaterally"}   Reflexes: Left: {smc PE -/+:77463::"Not performed"} Right: {smc PE -/+:86980::"Not performed"}   Sensation: {vibratory sensation:57330::"normal"}    Labs:  Last A1c: The patient doesn't have any registry metric data available     Imaging:   {Previous images reviewed:67341::"Previous images reviewed."}  X-rays ordered and performed today: {YES/NO:20267::"yes"}  # of views: {xray # views:58755::"3"} Laterality: {laterality:28280::"bilateral"}  My Interpretation:  Distal Radial ulnar joint space is overall {widening/decreased:66443::"Normal"} on AP views. Scapholunate interval distance is {widening/decreased:55427::"Normal"} on {laterality:69339::"bilateral"} AP views. A DISI/VISI {Is/is not:9024::"is not"} suggested on {laterality:07372::"bilateral"} hand series. Negative/positive ulnar variance {Is/is not:9024::"is not"} suggested on l{laterality:65128::"bilateral"} lateral views.  {Findings; injury xray:5769::"no fracture, dislocation, swelling or degenerative changes noted"} " "     Assessment:        Encounter Diagnoses   Code Name Primary?    S62.339D Closed boxer's fracture with routine healing, subsequent encounter           Plan:           No orders of the defined types were placed in this encounter.         Dx: {laterality:38175::"right"}. {Diagnoses; hand:43825:a} {acute/chronic:57614::"Acute in moderate exacerbation"}.   Treatment Plan: Discussed with patient diagnosis and treatment recommendations.   {[facility] wrist plan:41920::"Natural history and expected course discussed. Questions answered.","Educational material distributed.","Reduction in offending activity.","Gentle ROM exercises.","Rest, ice, compression, and elevation (RICE) therapy.","Plain film x-rays.","Home physical therapy exercise handouts provided to patient. ","Over the counter NSAID and/or tylenol provided you do not have contraindications such as but not limited to liver or kidney disease or uncontrolled blood pressure. If you're doctors have told you to to not take them based on your health, do not take them. "}  Imaging: {imaging review:01498::"radiological studies ordered and independently reviewed; discussed with patient; pending radiologist interpretation"}.   Procedure: Discussed CSI/VSI as treatment options; {CSI/VSI options:19683::"discussed CSI vs VS injections as treatment options; since conservative measures did not improve symptoms patient consented for CSI today"}.  Activity: Activity as tolerated  Therapy: {therapy:02149::"Physical Therapy"}  Medication: {therapy med options:07062::"first line treatment with daily acetaminophen. Up to 1000 mg three times daily can be taken; medication precautions given."}. Please see your primary care physician for further refills.  RTC: {rtc options:89802::"PRN; call if any issues"}.         Procedures    "

## 2023-01-24 DIAGNOSIS — S62.339D CLOSED BOXER'S FRACTURE WITH ROUTINE HEALING, SUBSEQUENT ENCOUNTER: Primary | ICD-10-CM

## 2023-01-26 ENCOUNTER — PATIENT MESSAGE (OUTPATIENT)
Dept: FAMILY MEDICINE | Facility: CLINIC | Age: 33
End: 2023-01-26
Payer: MEDICAID

## 2023-01-27 ENCOUNTER — TELEPHONE (OUTPATIENT)
Dept: FAMILY MEDICINE | Facility: CLINIC | Age: 33
End: 2023-01-27
Payer: MEDICAID

## 2023-01-27 NOTE — TELEPHONE ENCOUNTER
Pt called and left message referring to referral that was sent to [facility] in [place], pt stated that she was told that the office did not receive the fax, referral resent this morning, called pt twice to follow-up no answer and voicemail box full.

## 2023-07-02 ENCOUNTER — HOSPITAL ENCOUNTER (EMERGENCY)
Facility: HOSPITAL | Age: 33
Discharge: HOME | End: 2023-07-02
Payer: MEDICAID

## 2023-07-02 VITALS
RESPIRATION RATE: 17 BRPM | SYSTOLIC BLOOD PRESSURE: 128 MMHG | HEART RATE: 88 BPM | DIASTOLIC BLOOD PRESSURE: 81 MMHG | OXYGEN SATURATION: 100 % | HEIGHT: 63 IN | BODY MASS INDEX: 24.1 KG/M2 | TEMPERATURE: 99 F | WEIGHT: 136 LBS

## 2023-07-02 DIAGNOSIS — R31.9 URINARY TRACT INFECTION WITH HEMATURIA, SITE UNSPECIFIED: Primary | ICD-10-CM

## 2023-07-02 DIAGNOSIS — N39.0 URINARY TRACT INFECTION WITH HEMATURIA, SITE UNSPECIFIED: Primary | ICD-10-CM

## 2023-07-02 LAB
APPEARANCE UR: ABNORMAL
BACTERIA #/AREA URNS AUTO: ABNORMAL /HPF
BILIRUB UR QL STRIP.AUTO: ABNORMAL MG/DL
COLOR UR: ABNORMAL
GLUCOSE UR QL STRIP.AUTO: 100 MG/DL
KETONES UR QL STRIP.AUTO: ABNORMAL MG/DL
LEUKOCYTE ESTERASE UR QL STRIP.AUTO: ABNORMAL UNIT/L
NITRITE UR QL STRIP.AUTO: POSITIVE
PH UR STRIP.AUTO: 5 [PH]
PROT UR QL STRIP.AUTO: >=300 MG/DL
RBC #/AREA URNS AUTO: ABNORMAL /HPF
RBC UR QL AUTO: ABNORMAL UNIT/L
SP GR UR STRIP.AUTO: 1.02
SQUAMOUS #/AREA URNS AUTO: ABNORMAL /HPF
UROBILINOGEN UR STRIP-ACNC: >=8 MG/DL
WBC #/AREA URNS AUTO: ABNORMAL /HPF

## 2023-07-02 PROCEDURE — 99283 EMERGENCY DEPT VISIT LOW MDM: CPT

## 2023-07-02 PROCEDURE — 25000003 PHARM REV CODE 250: Performed by: NURSE PRACTITIONER

## 2023-07-02 PROCEDURE — 87088 URINE BACTERIA CULTURE: CPT | Performed by: NURSE PRACTITIONER

## 2023-07-02 PROCEDURE — 87186 SC STD MICRODIL/AGAR DIL: CPT | Performed by: NURSE PRACTITIONER

## 2023-07-02 PROCEDURE — 81001 URINALYSIS AUTO W/SCOPE: CPT | Performed by: NURSE PRACTITIONER

## 2023-07-02 RX ORDER — CEPHALEXIN 500 MG/1
500 CAPSULE ORAL
Status: COMPLETED | OUTPATIENT
Start: 2023-07-02 | End: 2023-07-02

## 2023-07-02 RX ORDER — CEPHALEXIN 500 MG/1
500 CAPSULE ORAL 4 TIMES DAILY
Qty: 20 CAPSULE | Refills: 0 | Status: SHIPPED | OUTPATIENT
Start: 2023-07-02 | End: 2023-07-07

## 2023-07-02 RX ADMIN — CEPHALEXIN 500 MG: 500 CAPSULE ORAL at 08:07

## 2023-07-03 NOTE — ED PROVIDER NOTES
Encounter Date: 2023       History     Chief Complaint   Patient presents with    Urinary Frequency     AMB TO ED WITH C/O POSSIBLE UTI. SHE IS HAVING SOME BURNING, FREQUENT URINATION SINCE YESTERDAY. DENIES ABD PAIN, DENIES N/V/D     Urinary Frequency, burning  Taking Azo no help      The history is provided by the patient. No  was used.   Review of patient's allergies indicates:   Allergen Reactions    Wasp sting [allergen ext-venom-honey bee] Swelling     Past Medical History:   Diagnosis Date    ADHD     Anemia      Past Surgical History:   Procedure Laterality Date     SECTION       SECTION      DILATION AND CURETTAGE OF UTERUS      hemarageric cyst      LAPAROSCOPY      miscarrage      MOUTH SURGERY      OVARY SURGERY       Family History   Problem Relation Age of Onset    Thalassemia Brother     Anemia Brother     Cervical cancer Mother     Hypertension Mother     Breast cancer Maternal Grandmother     Anemia Maternal Grandfather      Social History     Tobacco Use    Smoking status: Never    Smokeless tobacco: Never   Substance Use Topics    Alcohol use: Not Currently     Comment: occasionally    Drug use: Never     Review of Systems   Genitourinary:  Positive for dysuria, frequency and hematuria.   All other systems reviewed and are negative.    Physical Exam     Initial Vitals [23 1826]   BP Pulse Resp Temp SpO2   131/70 88 18 98.6 °F (37 °C) 98 %      MAP       --         Physical Exam    Nursing note and vitals reviewed.  Constitutional: She appears well-developed and well-nourished.   HENT:   Head: Normocephalic and atraumatic.   Eyes: EOM are normal. Pupils are equal, round, and reactive to light.   Neck: Neck supple.   Normal range of motion.  Cardiovascular:  Normal rate and regular rhythm.           Pulmonary/Chest: No respiratory distress.   Abdominal: Abdomen is soft. There is abdominal tenderness.   Suprapubic tenderness   Musculoskeletal:          General: Tenderness present. Normal range of motion.      Cervical back: Normal range of motion and neck supple.     Neurological: She is alert and oriented to person, place, and time. GCS score is 15. GCS eye subscore is 4. GCS verbal subscore is 5. GCS motor subscore is 6.   Skin: Skin is warm and dry. Capillary refill takes less than 2 seconds.   Psychiatric: She has a normal mood and affect. Thought content normal.       ED Course   Procedures  Labs Reviewed   URINALYSIS - Abnormal; Notable for the following components:       Result Value    Color, UA Red (*)     Appearance, UA SL CLOUDY (*)     Protein, UA >=300 (*)     Glucose,  (*)     Ketones, UA Trace (*)     Blood, UA Moderate (*)     Bilirubin, UA Small (*)     Urobilinogen, UA >=8.0 (*)     Nitrites, UA Positive (*)     Leukocyte Esterase, UA Moderate (*)     All other components within normal limits    Narrative:      URINE STABILITY IS 2 HOURS AT ROOM TEMP OR    SIX HOURS REFRIGERATED. PERFORMING TESTING ON    SPECIMENS GREATER THAN THIS AGE MAY AFFECT THE    FOLLOWING TESTS:    PH          SPECIFIC GRAVITY           BLOOD    CLARITY     BILIRUBIN               UROBILINOGEN   URINALYSIS, MICROSCOPIC - Abnormal; Notable for the following components:    Bacteria, UA 1+ (*)     RBC, UA 11-20 (*)     WBC, UA 21-50 (*)     Squamous Epithelial Cells, UA Few (*)     All other components within normal limits   CULTURE, URINE          Imaging Results    None          Medications   cephALEXin capsule 500 mg (500 mg Oral Given 7/2/23 2007)     Medical Decision Making:   Initial Assessment:   UTI  Differential Diagnosis:   Vaginitis,   Clinical Tests:   Lab Tests: Ordered and Reviewed       <> Summary of Lab: + nitrite, leuko  Blood-on cycle  ED Management:  Discussed results with patient.  Increase fluids/water  Take all of antibiotics and when completed then follow up with PCP and repeat urinalysis    Medications:  Cephalexin                             Clinical Impression:   Final diagnoses:  [N39.0, R31.9] Urinary tract infection with hematuria, site unspecified (Primary)        ED Disposition Condition    Discharge Stable          ED Prescriptions       Medication Sig Dispense Start Date End Date Auth. Provider    cephALEXin (KEFLEX) 500 MG capsule Take 1 capsule (500 mg total) by mouth 4 (four) times daily. for 5 days 20 capsule 7/2/2023 7/7/2023 AKIL Stout          Follow-up Information       Follow up With Specialties Details Why Contact Info    [facility]  Repeat urine after completion of antiobiotics to ensure clearing [address]  [facility] [id number]  [phone]               AKIL Stout  07/02/23 2015

## 2023-07-06 LAB
BACTERIA UR CULT: ABNORMAL
BACTERIA UR CULT: ABNORMAL

## 2023-07-12 ENCOUNTER — OFFICE VISIT (OUTPATIENT)
Dept: FAMILY MEDICINE | Facility: CLINIC | Age: 33
End: 2023-07-12
Payer: MEDICAID

## 2023-07-12 VITALS
WEIGHT: 136 LBS | SYSTOLIC BLOOD PRESSURE: 116 MMHG | DIASTOLIC BLOOD PRESSURE: 76 MMHG | OXYGEN SATURATION: 100 % | RESPIRATION RATE: 18 BRPM | BODY MASS INDEX: 24.1 KG/M2 | TEMPERATURE: 98 F | HEIGHT: 63 IN | HEART RATE: 79 BPM

## 2023-07-12 DIAGNOSIS — R30.0 BURNING WITH URINATION: Primary | ICD-10-CM

## 2023-07-12 DIAGNOSIS — N89.8 VAGINAL ITCHING: ICD-10-CM

## 2023-07-12 DIAGNOSIS — N39.0 URINARY TRACT INFECTION WITH HEMATURIA, SITE UNSPECIFIED: ICD-10-CM

## 2023-07-12 DIAGNOSIS — R31.9 URINARY TRACT INFECTION WITH HEMATURIA, SITE UNSPECIFIED: ICD-10-CM

## 2023-07-12 LAB
BILIRUB SERPL-MCNC: ABNORMAL MG/DL
BLOOD URINE, POC: ABNORMAL
CLARITY, POC UA: ABNORMAL
COLOR, POC UA: ABNORMAL
GLUCOSE UR QL STRIP: ABNORMAL
KETONES UR QL STRIP: ABNORMAL
LEUKOCYTE ESTERASE URINE, POC: ABNORMAL
NITRITE, POC UA: ABNORMAL
PH, POC UA: 6
PROTEIN, POC: ABNORMAL
SPECIFIC GRAVITY, POC UA: 1.01
UROBILINOGEN, POC UA: NORMAL

## 2023-07-12 PROCEDURE — 3078F PR MOST RECENT DIASTOLIC BLOOD PRESSURE < 80 MM HG: ICD-10-PCS | Mod: CPTII,,, | Performed by: REGISTERED NURSE

## 2023-07-12 PROCEDURE — 81002 URINALYSIS NONAUTO W/O SCOPE: CPT | Mod: PBBFAC | Performed by: REGISTERED NURSE

## 2023-07-12 PROCEDURE — 99213 OFFICE O/P EST LOW 20 MIN: CPT | Mod: 25,PBBFAC | Performed by: REGISTERED NURSE

## 2023-07-12 PROCEDURE — 3074F SYST BP LT 130 MM HG: CPT | Mod: CPTII,,, | Performed by: REGISTERED NURSE

## 2023-07-12 PROCEDURE — 3074F PR MOST RECENT SYSTOLIC BLOOD PRESSURE < 130 MM HG: ICD-10-PCS | Mod: CPTII,,, | Performed by: REGISTERED NURSE

## 2023-07-12 PROCEDURE — 96372 THER/PROPH/DIAG INJ SC/IM: CPT | Mod: PBBFAC

## 2023-07-12 PROCEDURE — 1160F RVW MEDS BY RX/DR IN RCRD: CPT | Mod: CPTII,,, | Performed by: REGISTERED NURSE

## 2023-07-12 PROCEDURE — 1160F PR REVIEW ALL MEDS BY PRESCRIBER/CLIN PHARMACIST DOCUMENTED: ICD-10-PCS | Mod: CPTII,,, | Performed by: REGISTERED NURSE

## 2023-07-12 PROCEDURE — 99214 PR OFFICE/OUTPT VISIT, EST, LEVL IV, 30-39 MIN: ICD-10-PCS | Mod: S$PBB,,, | Performed by: REGISTERED NURSE

## 2023-07-12 PROCEDURE — 99214 OFFICE O/P EST MOD 30 MIN: CPT | Mod: S$PBB,,, | Performed by: REGISTERED NURSE

## 2023-07-12 PROCEDURE — 3008F PR BODY MASS INDEX (BMI) DOCUMENTED: ICD-10-PCS | Mod: CPTII,,, | Performed by: REGISTERED NURSE

## 2023-07-12 PROCEDURE — 3078F DIAST BP <80 MM HG: CPT | Mod: CPTII,,, | Performed by: REGISTERED NURSE

## 2023-07-12 PROCEDURE — 99999 PR PBB SHADOW E&M-EST. PATIENT-LVL III: CPT | Mod: PBBFAC,,, | Performed by: REGISTERED NURSE

## 2023-07-12 PROCEDURE — 1159F MED LIST DOCD IN RCRD: CPT | Mod: CPTII,,, | Performed by: REGISTERED NURSE

## 2023-07-12 PROCEDURE — 1159F PR MEDICATION LIST DOCUMENTED IN MEDICAL RECORD: ICD-10-PCS | Mod: CPTII,,, | Performed by: REGISTERED NURSE

## 2023-07-12 PROCEDURE — 3008F BODY MASS INDEX DOCD: CPT | Mod: CPTII,,, | Performed by: REGISTERED NURSE

## 2023-07-12 PROCEDURE — 99999 PR PBB SHADOW E&M-EST. PATIENT-LVL III: ICD-10-PCS | Mod: PBBFAC,,, | Performed by: REGISTERED NURSE

## 2023-07-12 RX ORDER — CEFTRIAXONE 1 G/1
1 INJECTION, POWDER, FOR SOLUTION INTRAMUSCULAR; INTRAVENOUS
Status: COMPLETED | OUTPATIENT
Start: 2023-07-12 | End: 2023-07-12

## 2023-07-12 RX ORDER — FLUCONAZOLE 150 MG/1
150 TABLET ORAL DAILY
Qty: 2 TABLET | Refills: 0 | Status: SHIPPED | OUTPATIENT
Start: 2023-07-12 | End: 2023-07-14

## 2023-07-12 RX ADMIN — CEFTRIAXONE 1 G: 1 INJECTION, POWDER, FOR SOLUTION INTRAMUSCULAR; INTRAVENOUS at 09:07

## 2023-07-12 NOTE — PROGRESS NOTES
Subjective     Patient ID: Petra Springer is a 32 y.o. female.    Chief Complaint: Follow-up    Patient presents to clinic complaining of dysuria and vaginal itching x1 week.  Patient was treated on 07/2 for UTI with Keflex, patient completed this treatment on 7/7.  Patient denies Abdominal and flank pain at this time.    Follow-up  Pertinent negatives include no abdominal pain, chills, fatigue, fever, nausea or vomiting.   Review of Systems   Constitutional:  Negative for chills, fatigue and fever.   Gastrointestinal:  Negative for abdominal pain, nausea and vomiting.   Genitourinary:  Positive for dysuria, hematuria and vaginal pain. Negative for flank pain.        Vaginal itching and irritation   All other systems reviewed and are negative.       Objective     Physical Exam  Vitals and nursing note reviewed.   Constitutional:       Appearance: Normal appearance.   HENT:      Head: Normocephalic and atraumatic.   Eyes:      Extraocular Movements: Extraocular movements intact.      Conjunctiva/sclera: Conjunctivae normal.      Pupils: Pupils are equal, round, and reactive to light.   Cardiovascular:      Rate and Rhythm: Normal rate and regular rhythm.      Pulses: Normal pulses.      Heart sounds: Normal heart sounds.   Pulmonary:      Effort: Pulmonary effort is normal.      Breath sounds: Normal breath sounds.   Abdominal:      General: Abdomen is flat. Bowel sounds are normal.      Palpations: Abdomen is soft.      Tenderness: There is no abdominal tenderness. There is no right CVA tenderness or left CVA tenderness.   Musculoskeletal:         General: Normal range of motion.   Skin:     General: Skin is warm.      Capillary Refill: Capillary refill takes less than 2 seconds.   Neurological:      General: No focal deficit present.      Mental Status: She is alert.   Psychiatric:         Mood and Affect: Mood normal.         Behavior: Behavior normal.         Thought Content: Thought content normal.          Judgment: Judgment normal.          Assessment and Plan     1. Urinary tract infection with hematuria, site unspecified  -     cefTRIAXone injection 1 g given in clinic today.  Push fluids, patient may use OTC pyridium as desired.     3. Vaginal itching  -     fluconazole (DIFLUCAN) 150 MG Tab; Take 1 tablet (150 mg total) by mouth once daily. for 2 days  Dispense: 2 tablet; Refill: 0      Return to clinic in in 1 month for wellness, labs to be completed prior to visit.

## 2023-08-01 NOTE — TELEPHONE ENCOUNTER
----- Message from Nay Marquez sent at 8/1/2023 11:05 AM CDT -----  Regarding: Refill  .Type:  RX Refill Request    Who Called: Petra  Refill or New Rx:Refill  RX Name and Strength:CONCERTA 27 mg CR tablet  How is the patient currently taking it? (ex. 1XDay):1 day  Is this a 30 day or 90 day RX:30  Preferred Pharmacy with phone number:[facility] In [place]  Local or Mail Order:local  Ordering Provider:Izzy Lopez  Would the patient rather a call back or a response via [facility]?   Best Call Back Number:[phone]  Additional Information: CONCERTA 27 mg CR tablet   Got per approval from Shirley Childs

## 2023-08-07 RX ORDER — METHYLPHENIDATE HYDROCHLORIDE 27 MG/1
27 TABLET, EXTENDED RELEASE ORAL EVERY MORNING
OUTPATIENT
Start: 2023-08-07

## 2023-08-15 DIAGNOSIS — F98.8 ATTENTION DEFICIT DISORDER (ADD) IN ADULT: Primary | ICD-10-CM

## 2023-08-15 DIAGNOSIS — Z00.00 ENCOUNTER FOR WELLNESS EXAMINATION: ICD-10-CM

## 2023-10-16 PROBLEM — R31.9 URINARY TRACT INFECTION WITH HEMATURIA: Status: RESOLVED | Noted: 2023-07-12 | Resolved: 2023-10-16

## 2023-10-16 PROBLEM — N39.0 URINARY TRACT INFECTION WITH HEMATURIA: Status: RESOLVED | Noted: 2023-07-12 | Resolved: 2023-10-16
